# Patient Record
Sex: FEMALE | Race: WHITE | NOT HISPANIC OR LATINO | Employment: STUDENT | URBAN - NONMETROPOLITAN AREA
[De-identification: names, ages, dates, MRNs, and addresses within clinical notes are randomized per-mention and may not be internally consistent; named-entity substitution may affect disease eponyms.]

---
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
## 2024-01-31 DIAGNOSIS — F41.1 GENERALIZED ANXIETY DISORDER: ICD-10-CM

## 2024-01-31 RX ORDER — FLUOXETINE 10 MG/1
10 CAPSULE ORAL 2 TIMES DAILY
Qty: 60 CAPSULE | Refills: 3 | Status: SHIPPED | OUTPATIENT
Start: 2024-01-31 | End: 2024-05-30

## 2024-01-31 RX ORDER — FLUOXETINE HYDROCHLORIDE 20 MG/1
20 CAPSULE ORAL 2 TIMES DAILY
Qty: 60 CAPSULE | Refills: 3 | Status: SHIPPED | OUTPATIENT
Start: 2024-01-31 | End: 2024-05-30

## 2024-02-20 ENCOUNTER — OFFICE VISIT (OUTPATIENT)
Dept: FAMILY MEDICINE | Facility: CLINIC | Age: 19
End: 2024-02-20
Payer: COMMERCIAL

## 2024-02-20 VITALS
TEMPERATURE: 97 F | BODY MASS INDEX: 39.68 KG/M2 | WEIGHT: 293 LBS | HEIGHT: 72 IN | HEART RATE: 102 BPM | SYSTOLIC BLOOD PRESSURE: 132 MMHG | RESPIRATION RATE: 18 BRPM | OXYGEN SATURATION: 97 % | DIASTOLIC BLOOD PRESSURE: 88 MMHG

## 2024-02-20 DIAGNOSIS — J06.9 UPPER RESPIRATORY TRACT INFECTION, UNSPECIFIED TYPE: Primary | ICD-10-CM

## 2024-02-20 DIAGNOSIS — R05.1 ACUTE COUGH: ICD-10-CM

## 2024-02-20 DIAGNOSIS — R09.81 HEAD CONGESTION: ICD-10-CM

## 2024-02-20 DIAGNOSIS — J02.9 SORE THROAT: ICD-10-CM

## 2024-02-20 LAB
CTP QC/QA: YES
CTP QC/QA: YES
FLUAV AG NPH QL: NEGATIVE
FLUBV AG NPH QL: NEGATIVE
S PYO RRNA THROAT QL PROBE: NEGATIVE
SARS-COV-2 AG RESP QL IA.RAPID: NEGATIVE

## 2024-02-20 PROCEDURE — 3079F DIAST BP 80-89 MM HG: CPT | Mod: CPTII,,, | Performed by: NURSE PRACTITIONER

## 2024-02-20 PROCEDURE — 3075F SYST BP GE 130 - 139MM HG: CPT | Mod: CPTII,,, | Performed by: NURSE PRACTITIONER

## 2024-02-20 PROCEDURE — 1160F RVW MEDS BY RX/DR IN RCRD: CPT | Mod: CPTII,,, | Performed by: NURSE PRACTITIONER

## 2024-02-20 PROCEDURE — 3008F BODY MASS INDEX DOCD: CPT | Mod: CPTII,,, | Performed by: NURSE PRACTITIONER

## 2024-02-20 PROCEDURE — 1159F MED LIST DOCD IN RCRD: CPT | Mod: CPTII,,, | Performed by: NURSE PRACTITIONER

## 2024-02-20 PROCEDURE — 99213 OFFICE O/P EST LOW 20 MIN: CPT | Mod: ,,, | Performed by: NURSE PRACTITIONER

## 2024-02-20 PROCEDURE — 87428 SARSCOV & INF VIR A&B AG IA: CPT | Mod: QW,,, | Performed by: NURSE PRACTITIONER

## 2024-02-20 PROCEDURE — 87880 STREP A ASSAY W/OPTIC: CPT | Mod: QW,,, | Performed by: NURSE PRACTITIONER

## 2024-02-20 RX ORDER — METHYLPREDNISOLONE 4 MG/1
TABLET ORAL
Qty: 21 EACH | Refills: 0 | Status: SHIPPED | OUTPATIENT
Start: 2024-02-20 | End: 2024-03-05

## 2024-02-20 RX ORDER — AZITHROMYCIN 250 MG/1
TABLET, FILM COATED ORAL
Qty: 6 TABLET | Refills: 0 | Status: SHIPPED | OUTPATIENT
Start: 2024-02-20 | End: 2024-02-25

## 2024-02-20 NOTE — PROGRESS NOTES
Health Maintenance Due   Topic Date Due    Hepatitis C Screening  Never done    Lipid Panel  Never done    COVID-19 Vaccine (1) Never done    HPV Vaccines (1 - 2-dose series) Never done    HIV Screening  Never done    Chlamydia Screening  Never done    Influenza Vaccine (1) Never done     Discussed care gaps.  Not interested in vaccs/screening.  Not fasting for lipid.

## 2024-02-28 PROBLEM — J06.9 UPPER RESPIRATORY TRACT INFECTION: Status: ACTIVE | Noted: 2024-02-28

## 2024-02-28 NOTE — PROGRESS NOTES
STEPHEN Curry   [facility]  [address] [address] [address]  [phone]      PATIENT NAME: Olga Hernandez  : 2005  DATE: 24  MRN: [id number]      Billing Provider: STEPHEN Curry  Level of Service:   Patient PCP Information       Provider PCP Type    STEPHEN Curry General            Reason for Visit / Chief Complaint: Nasal Congestion, Cough, and Sore Throat (Symptoms X4 days.)       Update PCP  Update Chief Complaint         History of Present Illness / Problem Focused Workflow     19 year old female presents for head congestion,cough, sore throat  Symptoms started about 4 days ago      Review of Systems     Review of Systems   Constitutional:  Positive for fatigue. Negative for fever.   HENT:  Positive for congestion, sinus pressure and sore throat. Negative for ear pain.    Respiratory:  Positive for cough. Negative for shortness of breath.    Cardiovascular:  Negative for palpitations.   Gastrointestinal:  Negative for abdominal pain, constipation and diarrhea.   Endocrine: Negative for polydipsia and polyuria.   Musculoskeletal:  Negative for gait problem.   Neurological:  Negative for dizziness, weakness and headaches.   Psychiatric/Behavioral:  Negative for agitation and dysphoric mood.        Medical / Social / Family History     Past Medical History:   Diagnosis Date    Anxiety        Past Surgical History:   Procedure Laterality Date    INNER EAR SURGERY Bilateral     TONSILLECTOMY      WISDOM TOOTH EXTRACTION         Social History  Ms.  reports that she has been smoking cigarettes and vaping with nicotine. She started smoking about 2 years ago. She has never used smokeless tobacco. She reports that she does not drink alcohol and does not use drugs.    Family History  Ms.'s family history is not on file.    Medications and Allergies     Medications  Outpatient Medications Marked as Taking for the 24  encounter (Office Visit) with Veronika Roman FNP   Medication Sig Dispense Refill    busPIRone (BUSPAR) 7.5 MG tablet Take 1 tablet (7.5 mg total) by mouth 3 (three) times daily as needed (anxiety). 30 tablet 2    FLUoxetine 10 MG capsule Take 1 capsule (10 mg total) by mouth 2 (two) times daily. 60 capsule 3    FLUoxetine 20 MG capsule Take 1 capsule (20 mg total) by mouth 2 (two) times daily. 60 capsule 3       Allergies  Review of patient's allergies indicates:  No Known Allergies    Physical Examination     Vitals:    02/20/24 1400   BP: 132/88   Pulse: 102   Resp: 18   Temp: 97.2 °F (36.2 °C)     Physical Exam  Constitutional:       General: She is not in acute distress.  HENT:      Head: Normocephalic.      Ears:      Comments: Redness to bilat TM     Nose: Congestion present.      Mouth/Throat:      Mouth: Mucous membranes are moist.      Pharynx: Posterior oropharyngeal erythema present.   Eyes:      Extraocular Movements: Extraocular movements intact.   Cardiovascular:      Rate and Rhythm: Normal rate.   Pulmonary:      Effort: Pulmonary effort is normal. No respiratory distress.      Breath sounds: No wheezing.   Abdominal:      General: Bowel sounds are normal.      Palpations: Abdomen is soft.   Musculoskeletal:         General: Normal range of motion.      Cervical back: Normal range of motion.   Skin:     General: Skin is warm.   Neurological:      Mental Status: She is alert.   Psychiatric:         Behavior: Behavior normal.           Imaging / Labs     Office Visit on 02/20/2024   Component Date Value Ref Range Status    SARS Coronavirus 2 Antigen 02/20/2024 Negative  Negative Final    Rapid Influenza A Ag 02/20/2024 Negative  Negative Final    Rapid Influenza B Ag 02/20/2024 Negative  Negative Final     Acceptable 02/20/2024 Yes   Final    Rapid Strep A Screen 02/20/2024 Negative  Negative Final     Acceptable 02/20/2024 Yes   Final     No image results  found.      Assessment and Plan (including Health Maintenance)      Problem List  Smart Sets  Document Outside HM   :    Health Maintenance Due   Topic Date Due    Hepatitis C Screening  Never done    Lipid Panel  Never done    COVID-19 Vaccine (1) Never done    Pneumococcal Vaccines (Age 0-64) (1 of 2 - PCV) Never done    HPV Vaccines (1 - 2-dose series) Never done    HIV Screening  Never done    Chlamydia Screening  Never done    Influenza Vaccine (1) Never done       Problem List Items Addressed This Visit          ENT    Upper respiratory tract infection - Primary    Current Assessment & Plan     Head congestion, sore throat  Negative covid/flu, strep  Start zithromax, medrol dose pack po  Rest. Increase fluids as tolerated          Relevant Medications    methylPREDNISolone (MEDROL DOSEPACK) 4 mg tablet     Other Visit Diagnoses       Acute cough        Relevant Orders    POCT SARS-COV2 (COVID) with Flu Antigen (Completed)    Sore throat        Relevant Orders    POCT rapid strep A (Completed)    Head congestion        Relevant Medications    methylPREDNISolone (MEDROL DOSEPACK) 4 mg tablet            Health Maintenance Topics with due status: Not Due       Topic Last Completion Date    TETANUS VACCINE 08/01/2016       Future Appointments   Date Time Provider Department Center   4/3/2024  2:00 PM Veronika Roman FNP [facility]          Signature:  STEPHEN [facility] [address] [place] [address]  [phone]    Date of encounter: 2/20/24

## 2024-02-28 NOTE — ASSESSMENT & PLAN NOTE
Head congestion, sore throat  Negative covid/flu, strep  Start zithromax, medrol dose pack po  Rest. Increase fluids as tolerated

## 2024-03-05 ENCOUNTER — OFFICE VISIT (OUTPATIENT)
Dept: FAMILY MEDICINE | Facility: CLINIC | Age: 19
End: 2024-03-05
Payer: COMMERCIAL

## 2024-03-05 VITALS
HEART RATE: 112 BPM | RESPIRATION RATE: 18 BRPM | SYSTOLIC BLOOD PRESSURE: 162 MMHG | WEIGHT: 293 LBS | HEIGHT: 72 IN | TEMPERATURE: 97 F | DIASTOLIC BLOOD PRESSURE: 102 MMHG | OXYGEN SATURATION: 98 % | BODY MASS INDEX: 39.68 KG/M2

## 2024-03-05 DIAGNOSIS — R03.0 ELEVATED BLOOD PRESSURE READING: Primary | ICD-10-CM

## 2024-03-05 DIAGNOSIS — Z13.1 SCREENING FOR DIABETES MELLITUS: ICD-10-CM

## 2024-03-05 DIAGNOSIS — Z11.59 ENCOUNTER FOR HEPATITIS C SCREENING TEST FOR LOW RISK PATIENT: ICD-10-CM

## 2024-03-05 DIAGNOSIS — R63.5 WEIGHT GAIN: ICD-10-CM

## 2024-03-05 DIAGNOSIS — Z11.4 SCREENING FOR HIV (HUMAN IMMUNODEFICIENCY VIRUS): ICD-10-CM

## 2024-03-05 LAB
BASOPHILS # BLD AUTO: 0.07 K/UL (ref 0–0.2)
BASOPHILS NFR BLD AUTO: 0.7 % (ref 0–1)
DIFFERENTIAL METHOD BLD: ABNORMAL
EOSINOPHIL # BLD AUTO: 0.23 K/UL (ref 0–0.5)
EOSINOPHIL NFR BLD AUTO: 2.4 % (ref 1–4)
ERYTHROCYTE [DISTWIDTH] IN BLOOD BY AUTOMATED COUNT: 12.5 % (ref 11.5–14.5)
HCT VFR BLD AUTO: 41.8 % (ref 38–47)
HGB BLD-MCNC: 14.1 G/DL (ref 12–16)
IMM GRANULOCYTES # BLD AUTO: 0.05 K/UL (ref 0–0.04)
IMM GRANULOCYTES NFR BLD: 0.5 % (ref 0–0.4)
LYMPHOCYTES # BLD AUTO: 3.08 K/UL (ref 1–4.8)
LYMPHOCYTES NFR BLD AUTO: 32.7 % (ref 27–41)
MCH RBC QN AUTO: 29.2 PG (ref 27–31)
MCHC RBC AUTO-ENTMCNC: 33.7 G/DL (ref 32–36)
MCV RBC AUTO: 86.5 FL (ref 80–96)
MONOCYTES # BLD AUTO: 0.71 K/UL (ref 0–0.8)
MONOCYTES NFR BLD AUTO: 7.5 % (ref 2–6)
MPC BLD CALC-MCNC: 11.8 FL (ref 9.4–12.4)
NEUTROPHILS # BLD AUTO: 5.28 K/UL (ref 1.8–7.7)
NEUTROPHILS NFR BLD AUTO: 56.2 % (ref 53–65)
NRBC # BLD AUTO: 0 X10E3/UL
NRBC, AUTO (.00): 0 %
PLATELET # BLD AUTO: 281 K/UL (ref 150–400)
RBC # BLD AUTO: 4.83 M/UL (ref 4.2–5.4)
WBC # BLD AUTO: 9.42 K/UL (ref 4.5–11)

## 2024-03-05 PROCEDURE — 3044F HG A1C LEVEL LT 7.0%: CPT | Mod: CPTII,,, | Performed by: NURSE PRACTITIONER

## 2024-03-05 PROCEDURE — 1159F MED LIST DOCD IN RCRD: CPT | Mod: CPTII,,, | Performed by: NURSE PRACTITIONER

## 2024-03-05 PROCEDURE — 3077F SYST BP >= 140 MM HG: CPT | Mod: CPTII,,, | Performed by: NURSE PRACTITIONER

## 2024-03-05 PROCEDURE — 82607 VITAMIN B-12: CPT | Mod: ,,, | Performed by: CLINICAL MEDICAL LABORATORY

## 2024-03-05 PROCEDURE — 3080F DIAST BP >= 90 MM HG: CPT | Mod: CPTII,,, | Performed by: NURSE PRACTITIONER

## 2024-03-05 PROCEDURE — 3008F BODY MASS INDEX DOCD: CPT | Mod: CPTII,,, | Performed by: NURSE PRACTITIONER

## 2024-03-05 PROCEDURE — 1160F RVW MEDS BY RX/DR IN RCRD: CPT | Mod: CPTII,,, | Performed by: NURSE PRACTITIONER

## 2024-03-05 PROCEDURE — 83036 HEMOGLOBIN GLYCOSYLATED A1C: CPT | Mod: ,,, | Performed by: CLINICAL MEDICAL LABORATORY

## 2024-03-05 PROCEDURE — 85025 COMPLETE CBC W/AUTO DIFF WBC: CPT | Mod: ,,, | Performed by: CLINICAL MEDICAL LABORATORY

## 2024-03-05 PROCEDURE — 80053 COMPREHEN METABOLIC PANEL: CPT | Mod: ,,, | Performed by: CLINICAL MEDICAL LABORATORY

## 2024-03-05 PROCEDURE — 87389 HIV-1 AG W/HIV-1&-2 AB AG IA: CPT | Mod: ,,, | Performed by: CLINICAL MEDICAL LABORATORY

## 2024-03-05 PROCEDURE — 86803 HEPATITIS C AB TEST: CPT | Mod: ,,, | Performed by: CLINICAL MEDICAL LABORATORY

## 2024-03-05 PROCEDURE — 82306 VITAMIN D 25 HYDROXY: CPT | Mod: ,,, | Performed by: CLINICAL MEDICAL LABORATORY

## 2024-03-05 PROCEDURE — 82746 ASSAY OF FOLIC ACID SERUM: CPT | Mod: ,,, | Performed by: CLINICAL MEDICAL LABORATORY

## 2024-03-05 PROCEDURE — 99213 OFFICE O/P EST LOW 20 MIN: CPT | Mod: ,,, | Performed by: NURSE PRACTITIONER

## 2024-03-05 PROCEDURE — 84443 ASSAY THYROID STIM HORMONE: CPT | Mod: ,,, | Performed by: CLINICAL MEDICAL LABORATORY

## 2024-03-05 PROCEDURE — 82533 TOTAL CORTISOL: CPT | Mod: ,,, | Performed by: CLINICAL MEDICAL LABORATORY

## 2024-03-05 RX ORDER — CLONIDINE HYDROCHLORIDE 0.1 MG/1
0.1 TABLET ORAL
Status: COMPLETED | OUTPATIENT
Start: 2024-03-05 | End: 2024-03-05

## 2024-03-05 RX ORDER — VALSARTAN 40 MG/1
40 TABLET ORAL DAILY
Qty: 30 TABLET | Refills: 2 | Status: SHIPPED | OUTPATIENT
Start: 2024-03-05 | End: 2024-03-19 | Stop reason: ALTCHOICE

## 2024-03-05 RX ADMIN — CLONIDINE HYDROCHLORIDE 0.1 MG: 0.1 TABLET ORAL at 03:03

## 2024-03-05 NOTE — PROGRESS NOTES
Health Maintenance Due   Topic Date Due    Hepatitis C Screening  Never done    Lipid Panel  Never done    COVID-19 Vaccine (1) Never done    Pneumococcal Vaccines (Age 0-64) (1 of 2 - PCV) Never done    HPV Vaccines (1 - 2-dose series) Never done    HIV Screening  Never done    Chlamydia Screening  Never done    Influenza Vaccine (1) Never done     Discussed care gaps.  Not interested in vaccs/screenings.

## 2024-03-06 LAB
25(OH)D3 SERPL-MCNC: 33.2 NG/ML
ALBUMIN SERPL BCP-MCNC: 3.7 G/DL (ref 3.5–5)
ALBUMIN/GLOB SERPL: 1.1 {RATIO}
ALP SERPL-CCNC: 107 U/L (ref 52–144)
ALT SERPL W P-5'-P-CCNC: 42 U/L (ref 13–56)
ANION GAP SERPL CALCULATED.3IONS-SCNC: 11 MMOL/L (ref 7–16)
AST SERPL W P-5'-P-CCNC: 32 U/L (ref 15–37)
BILIRUB SERPL-MCNC: 0.3 MG/DL (ref ?–1.2)
BUN SERPL-MCNC: 11 MG/DL (ref 7–18)
BUN/CREAT SERPL: 14 (ref 6–20)
CALCIUM SERPL-MCNC: 9 MG/DL (ref 8.5–10.1)
CHLORIDE SERPL-SCNC: 106 MMOL/L (ref 98–107)
CO2 SERPL-SCNC: 28 MMOL/L (ref 21–32)
CORTIS SERPL-MCNC: 11 ΜG/DL
CREAT SERPL-MCNC: 0.77 MG/DL (ref 0.55–1.02)
EGFR (NO RACE VARIABLE) (RUSH/TITUS): 113 ML/MIN/1.73M2
EST. AVERAGE GLUCOSE BLD GHB EST-MCNC: 126 MG/DL
FOLATE SERPL-MCNC: 14.4 NG/ML (ref 3.1–17.5)
GLOBULIN SER-MCNC: 3.3 G/DL (ref 2–4)
GLUCOSE SERPL-MCNC: 107 MG/DL (ref 74–106)
HBA1C MFR BLD HPLC: 6 % (ref 4.5–6.6)
HCV AB SER QL: NORMAL
HIV 1+O+2 AB SERPL QL: NORMAL
POTASSIUM SERPL-SCNC: 4.5 MMOL/L (ref 3.5–5.1)
PROT SERPL-MCNC: 7 G/DL (ref 6.4–8.2)
SODIUM SERPL-SCNC: 140 MMOL/L (ref 136–145)
TSH SERPL DL<=0.005 MIU/L-ACNC: 0.7 UIU/ML (ref 0.36–3.74)
VIT B12 SERPL-MCNC: 234 PG/ML (ref 193–986)

## 2024-03-13 PROBLEM — R03.0 ELEVATED BLOOD PRESSURE READING: Status: ACTIVE | Noted: 2024-03-13

## 2024-03-13 PROBLEM — R63.5 WEIGHT GAIN: Status: ACTIVE | Noted: 2024-03-13

## 2024-03-13 NOTE — ASSESSMENT & PLAN NOTE
Weight gain over the past year; would like meds to help lose weight  Blood pressure is elevated  Discussed with her the need to get blood pressure under control before trying any meds  Also will get labs   Low sodium, low calorie diet

## 2024-03-13 NOTE — ASSESSMENT & PLAN NOTE
Blood pressure 162/102 upon arrival to clinic today  Decreased following clonidine 0.1 mg po   Denies any HA, dizziness, chest pain  Labs today  Will start on valsartan po daily   Monitor blood pressure at home daily  Educated on low sodium, low calorie diet

## 2024-03-13 NOTE — PROGRESS NOTES
STEPHEN Curry   [facility]  [address] [address] [address]  [phone]      PATIENT NAME: Olga Hernandez  : 2005  DATE: 3/5/24  MRN: [id number]      Billing Provider: STEPHEN Curry  Level of Service:   Patient PCP Information       Provider PCP Type    STEPHEN Curry General            Reason for Visit / Chief Complaint: Obesity (Wants to discuss weight loss options)       Update PCP  Update Chief Complaint         History of Present Illness / Problem Focused Workflow     19 year old female presents with complaints of obesity  Wants to discuss weight loss options  Blood pressure elevated upon arrival to clinic         Review of Systems     Review of Systems   Constitutional:  Positive for unexpected weight change. Negative for fatigue and fever.   HENT:  Negative for congestion.    Respiratory:  Negative for cough and shortness of breath.    Cardiovascular:  Negative for palpitations.   Gastrointestinal:  Negative for abdominal pain, constipation and diarrhea.   Endocrine: Negative for polydipsia and polyuria.   Musculoskeletal:  Negative for gait problem.   Neurological:  Negative for dizziness, weakness and headaches.   Psychiatric/Behavioral:  Negative for agitation and dysphoric mood.        Medical / Social / Family History     Past Medical History:   Diagnosis Date    Anxiety        Past Surgical History:   Procedure Laterality Date    INNER EAR SURGERY Bilateral     TONSILLECTOMY      WISDOM TOOTH EXTRACTION         Social History  Ms.  reports that she has been smoking cigarettes and vaping with nicotine. She started smoking about 2 years ago. She has never used smokeless tobacco. She reports that she does not drink alcohol and does not use drugs.    Family History  Ms.'s family history is not on file.    Medications and Allergies     Medications  Outpatient Medications Marked as Taking for the 3/5/24 encounter (Office  Visit) with Veronika Roman FNP   Medication Sig Dispense Refill    FLUoxetine 10 MG capsule Take 1 capsule (10 mg total) by mouth 2 (two) times daily. 60 capsule 3    FLUoxetine 20 MG capsule Take 1 capsule (20 mg total) by mouth 2 (two) times daily. 60 capsule 3    [DISCONTINUED] busPIRone (BUSPAR) 7.5 MG tablet Take 1 tablet (7.5 mg total) by mouth 3 (three) times daily as needed (anxiety). 30 tablet 2       Allergies  Review of patient's allergies indicates:  No Known Allergies    Physical Examination     Vitals:    03/05/24 1549   BP: (!) 162/102   Pulse: (!) 112   Resp:    Temp:      Physical Exam  Constitutional:       General: She is not in acute distress.     Appearance: She is obese.   HENT:      Head: Normocephalic.      Nose: Nose normal.      Mouth/Throat:      Mouth: Mucous membranes are moist.   Eyes:      Extraocular Movements: Extraocular movements intact.   Cardiovascular:      Rate and Rhythm: Normal rate.   Pulmonary:      Effort: Pulmonary effort is normal. No respiratory distress.   Abdominal:      General: Bowel sounds are normal.      Palpations: Abdomen is soft.   Musculoskeletal:         General: Normal range of motion.      Cervical back: Normal range of motion.   Skin:     General: Skin is warm.   Neurological:      Mental Status: She is alert.   Psychiatric:         Behavior: Behavior normal.           Imaging / Labs     Office Visit on 03/05/2024   Component Date Value Ref Range Status    Hepatitis C Ab 03/05/2024 Non-Reactive  Non-Reactive Final    HIV 1/2 03/05/2024 Non-Reactive  Non-Reactive Final    TSH 03/05/2024 0.703  0.358 - 3.740 uIU/mL Final    Sodium 03/05/2024 140  136 - 145 mmol/L Final    Potassium 03/05/2024 4.5  3.5 - 5.1 mmol/L Final    Chloride 03/05/2024 106  98 - 107 mmol/L Final    CO2 03/05/2024 28  21 - 32 mmol/L Final    Anion Gap 03/05/2024 11  7 - 16 mmol/L Final    Glucose 03/05/2024 107 (H)  74 - 106 mg/dL Final    BUN 03/05/2024 11  7 - 18 mg/dL Final     Creatinine 03/05/2024 0.77  0.55 - 1.02 mg/dL Final    BUN/Creatinine Ratio 03/05/2024 14  6 - 20 Final    Calcium 03/05/2024 9.0  8.5 - 10.1 mg/dL Final    Total Protein 03/05/2024 7.0  6.4 - 8.2 g/dL Final    Albumin 03/05/2024 3.7  3.5 - 5.0 g/dL Final    Globulin 03/05/2024 3.3  2.0 - 4.0 g/dL Final    A/G Ratio 03/05/2024 1.1   Final    Bilirubin, Total 03/05/2024 0.3  >0.0 - 1.2 mg/dL Final    Alk Phos 03/05/2024 107  52 - 144 U/L Final    ALT 03/05/2024 42  13 - 56 U/L Final    AST 03/05/2024 32  15 - 37 U/L Final    eGFR 03/05/2024 113  >=60 mL/min/1.73m2 Final    Vitamin D 25-Hydroxy, Blood 03/05/2024 33.2  ng/mL Final    Hemoglobin A1C 03/05/2024 6.0  4.5 - 6.6 % Final    Estimated Average Glucose 03/05/2024 126  mg/dL Final    Vitamin B12 03/05/2024 234  193 - 986 pg/mL Final    Folate 03/05/2024 14.4  3.1 - 17.5 ng/mL Final    Cortisol 03/05/2024 11.0  µg/dL Final    WBC 03/05/2024 9.42  4.50 - 11.00 K/uL Final    RBC 03/05/2024 4.83  4.20 - 5.40 M/uL Final    Hemoglobin 03/05/2024 14.1  12.0 - 16.0 g/dL Final    Hematocrit 03/05/2024 41.8  38.0 - 47.0 % Final    MCV 03/05/2024 86.5  80.0 - 96.0 fL Final    MCH 03/05/2024 29.2  27.0 - 31.0 pg Final    MCHC 03/05/2024 33.7  32.0 - 36.0 g/dL Final    RDW 03/05/2024 12.5  11.5 - 14.5 % Final    Platelet Count 03/05/2024 281  150 - 400 K/uL Final    MPV 03/05/2024 11.8  9.4 - 12.4 fL Final    Neutrophils % 03/05/2024 56.2  53.0 - 65.0 % Final    Lymphocytes % 03/05/2024 32.7  27.0 - 41.0 % Final    Monocytes % 03/05/2024 7.5 (H)  2.0 - 6.0 % Final    Eosinophils % 03/05/2024 2.4  1.0 - 4.0 % Final    Basophils % 03/05/2024 0.7  0.0 - 1.0 % Final    Immature Granulocytes % 03/05/2024 0.5 (H)  0.0 - 0.4 % Final    nRBC, Auto 03/05/2024 0.0  <=0.0 % Final    Neutrophils, Abs 03/05/2024 5.28  1.80 - 7.70 K/uL Final    Lymphocytes, Absolute 03/05/2024 3.08  1.00 - 4.80 K/uL Final    Monocytes, Absolute 03/05/2024 0.71  0.00 - 0.80 K/uL Final    Eosinophils,  Absolute 03/05/2024 0.23  0.00 - 0.50 K/uL Final    Basophils, Absolute 03/05/2024 0.07  0.00 - 0.20 K/uL Final    Immature Granulocytes, Absolute 03/05/2024 0.05 (H)  0.00 - 0.04 K/uL Final    nRBC, Absolute 03/05/2024 0.00  <=0.00 x10e3/uL Final    Diff Type 03/05/2024 Auto   Final     No image results found.      Assessment and Plan (including Health Maintenance)      Problem List  Smart Sets  Document Outside HM   :    Health Maintenance Due   Topic Date Due    Lipid Panel  Never done    COVID-19 Vaccine (1) Never done    Pneumococcal Vaccines (Age 0-64) (1 of 2 - PCV) Never done    HPV Vaccines (1 - 2-dose series) Never done    Chlamydia Screening  Never done    Influenza Vaccine (1) Never done       Problem List Items Addressed This Visit          Cardiac/Vascular    Elevated blood pressure reading - Primary    Current Assessment & Plan     Blood pressure 162/102 upon arrival to clinic today  Decreased following clonidine 0.1 mg po   Denies any HA, dizziness, chest pain  Labs today  Will start on valsartan po daily   Monitor blood pressure at home daily  Educated on low sodium, low calorie diet          Relevant Medications    valsartan (DIOVAN) 40 MG tablet    Other Relevant Orders    Comprehensive Metabolic Panel (Completed)    CBC Auto Differential (Completed)       Endocrine    Weight gain    Current Assessment & Plan     Weight gain over the past year; would like meds to help lose weight  Blood pressure is elevated  Discussed with her the need to get blood pressure under control before trying any meds  Also will get labs   Low sodium, low calorie diet         Relevant Orders    TSH (Completed)    Comprehensive Metabolic Panel (Completed)    CBC Auto Differential (Completed)    Vitamin D (Completed)    Vitamin B12 & Folate (Completed)    Cortisol (Completed)     Other Visit Diagnoses       Encounter for hepatitis C screening test for low risk patient        Relevant Orders    Hepatitis C Antibody  (Completed)    Screening for HIV (human immunodeficiency virus)        Relevant Orders    HIV 1/2 Ag/Ab (4th Gen) (Completed)    Screening for diabetes mellitus        Relevant Orders    Hemoglobin A1C (Completed)            Health Maintenance Topics with due status: Not Due       Topic Last Completion Date    TETANUS VACCINE 08/01/2016       Future Appointments   Date Time Provider Department Center   3/19/2024  2:40 PM Jessie, Liliana, FNP [facility]   4/3/2024  2:00 PM Jessie, Liliana, FNP [facility]          Signature:  [facility] [address] [place] [address]  [phone]    Date of encounter: 3/5/24

## 2024-03-19 ENCOUNTER — OFFICE VISIT (OUTPATIENT)
Dept: FAMILY MEDICINE | Facility: CLINIC | Age: 19
End: 2024-03-19
Payer: COMMERCIAL

## 2024-03-19 VITALS
WEIGHT: 293 LBS | DIASTOLIC BLOOD PRESSURE: 92 MMHG | HEIGHT: 72 IN | TEMPERATURE: 97 F | OXYGEN SATURATION: 97 % | HEART RATE: 89 BPM | SYSTOLIC BLOOD PRESSURE: 138 MMHG | RESPIRATION RATE: 18 BRPM | BODY MASS INDEX: 39.68 KG/M2

## 2024-03-19 DIAGNOSIS — I10 HYPERTENSION, UNSPECIFIED TYPE: Primary | ICD-10-CM

## 2024-03-19 DIAGNOSIS — R03.0 ELEVATED BLOOD PRESSURE READING: ICD-10-CM

## 2024-03-19 PROCEDURE — 1160F RVW MEDS BY RX/DR IN RCRD: CPT | Mod: CPTII,,, | Performed by: NURSE PRACTITIONER

## 2024-03-19 PROCEDURE — 4010F ACE/ARB THERAPY RXD/TAKEN: CPT | Mod: CPTII,,, | Performed by: NURSE PRACTITIONER

## 2024-03-19 PROCEDURE — 3080F DIAST BP >= 90 MM HG: CPT | Mod: CPTII,,, | Performed by: NURSE PRACTITIONER

## 2024-03-19 PROCEDURE — 3008F BODY MASS INDEX DOCD: CPT | Mod: CPTII,,, | Performed by: NURSE PRACTITIONER

## 2024-03-19 PROCEDURE — 3075F SYST BP GE 130 - 139MM HG: CPT | Mod: CPTII,,, | Performed by: NURSE PRACTITIONER

## 2024-03-19 PROCEDURE — 3044F HG A1C LEVEL LT 7.0%: CPT | Mod: CPTII,,, | Performed by: NURSE PRACTITIONER

## 2024-03-19 PROCEDURE — 1159F MED LIST DOCD IN RCRD: CPT | Mod: CPTII,,, | Performed by: NURSE PRACTITIONER

## 2024-03-19 PROCEDURE — 99213 OFFICE O/P EST LOW 20 MIN: CPT | Mod: ,,, | Performed by: NURSE PRACTITIONER

## 2024-03-19 RX ORDER — VALSARTAN AND HYDROCHLOROTHIAZIDE 320; 25 MG/1; MG/1
1 TABLET, FILM COATED ORAL DAILY
Qty: 30 TABLET | Refills: 1 | Status: SHIPPED | OUTPATIENT
Start: 2024-03-19 | End: 2024-05-30 | Stop reason: SDUPTHER

## 2024-03-19 RX ORDER — CLONIDINE HYDROCHLORIDE 0.1 MG/1
0.1 TABLET ORAL
Status: COMPLETED | OUTPATIENT
Start: 2024-03-19 | End: 2024-03-19

## 2024-03-19 RX ADMIN — CLONIDINE HYDROCHLORIDE 0.1 MG: 0.1 TABLET ORAL at 03:03

## 2024-03-19 NOTE — PROGRESS NOTES
Health Maintenance Due   Topic Date Due    Lipid Panel  Never done    COVID-19 Vaccine (1) Never done    Pneumococcal Vaccines (Age 0-64) (1 of 2 - PCV) Never done    HPV Vaccines (1 - 2-dose series) Never done    Chlamydia Screening  Never done    Influenza Vaccine (1) Never done     Discussed care gaps with pt   Pt is not interested in nay vaccines or screenings today

## 2024-03-25 PROBLEM — J06.9 UPPER RESPIRATORY TRACT INFECTION: Status: RESOLVED | Noted: 2024-02-28 | Resolved: 2024-03-25

## 2024-03-25 PROBLEM — I10 HYPERTENSION: Status: ACTIVE | Noted: 2024-03-25

## 2024-03-26 NOTE — ASSESSMENT & PLAN NOTE
Blood pressure 162/118 upon arrival to clinic  Reading down to 138/92 following 0.1 mg of clonidine  Denies any chest pain, SOB, dizziness  Change to diovan -25 po daily   Will refer to cardiology for evaluation  Monitor blood pressure at home daily  Low sodium diet

## 2024-03-26 NOTE — PROGRESS NOTES
STEPHEN Curry   [facility]  [address] [address] [address]  [phone]      PATIENT NAME: Olga Hernandez  : 2005  DATE: 3/19/24  MRN: [id number]      Billing Provider: STEPHEN Curry  Level of Service:   Patient PCP Information       Provider PCP Type    STEPHEN Curry General            Reason for Visit / Chief Complaint: Hypertension (Two week F/U for elevated BP )       Update PCP  Update Chief Complaint         History of Present Illness / Problem Focused Workflow     19 year old female presents for follow up on hypertension  Blood pressure continues to be elevated  Reports she is checking at home daily       Review of Systems     Review of Systems   Constitutional:  Positive for unexpected weight change. Negative for fatigue and fever.   HENT:  Negative for congestion.    Respiratory:  Negative for cough and shortness of breath.    Cardiovascular:  Negative for palpitations.   Gastrointestinal:  Negative for abdominal pain, constipation and diarrhea.   Endocrine: Negative for polydipsia and polyuria.   Musculoskeletal:  Negative for gait problem.   Neurological:  Negative for dizziness, weakness and headaches.   Psychiatric/Behavioral:  Negative for agitation and dysphoric mood.        Medical / Social / Family History     Past Medical History:   Diagnosis Date    Anxiety        Past Surgical History:   Procedure Laterality Date    INNER EAR SURGERY Bilateral     TONSILLECTOMY      WISDOM TOOTH EXTRACTION         Social History  Ms.  reports that she has been smoking cigarettes and vaping with nicotine. She started smoking about 2 years ago. She has never used smokeless tobacco. She reports that she does not drink alcohol and does not use drugs.    Family History  Ms.'s family history is not on file.    Medications and Allergies     Medications  Outpatient Medications Marked as Taking for the 3/19/24 encounter (Office  Visit) with Veronika Roman FNP   Medication Sig Dispense Refill    FLUoxetine 10 MG capsule Take 1 capsule (10 mg total) by mouth 2 (two) times daily. 60 capsule 3    FLUoxetine 20 MG capsule Take 1 capsule (20 mg total) by mouth 2 (two) times daily. 60 capsule 3    [DISCONTINUED] valsartan (DIOVAN) 40 MG tablet Take 1 tablet (40 mg total) by mouth once daily. 30 tablet 2       Allergies  Review of patient's allergies indicates:  No Known Allergies    Physical Examination     Vitals:    03/19/24 1530   BP: (!) 138/92   Pulse:    Resp:    Temp:      Physical Exam  Constitutional:       General: She is not in acute distress.     Appearance: She is obese.   HENT:      Head: Normocephalic.      Nose: Nose normal.      Mouth/Throat:      Mouth: Mucous membranes are moist.   Eyes:      Extraocular Movements: Extraocular movements intact.   Cardiovascular:      Rate and Rhythm: Normal rate.   Pulmonary:      Effort: Pulmonary effort is normal. No respiratory distress.   Abdominal:      General: Bowel sounds are normal.      Palpations: Abdomen is soft.   Musculoskeletal:         General: Normal range of motion.      Cervical back: Normal range of motion.   Skin:     General: Skin is warm.   Neurological:      Mental Status: She is alert.   Psychiatric:         Behavior: Behavior normal.           Imaging / Labs     No visits with results within 1 Day(s) from this visit.   Latest known visit with results is:   Office Visit on 03/05/2024   Component Date Value Ref Range Status    Hepatitis C Ab 03/05/2024 Non-Reactive  Non-Reactive Final    HIV 1/2 03/05/2024 Non-Reactive  Non-Reactive Final    TSH 03/05/2024 0.703  0.358 - 3.740 uIU/mL Final    Sodium 03/05/2024 140  136 - 145 mmol/L Final    Potassium 03/05/2024 4.5  3.5 - 5.1 mmol/L Final    Chloride 03/05/2024 106  98 - 107 mmol/L Final    CO2 03/05/2024 28  21 - 32 mmol/L Final    Anion Gap 03/05/2024 11  7 - 16 mmol/L Final    Glucose 03/05/2024 107 (H)  74 - 106  mg/dL Final    BUN 03/05/2024 11  7 - 18 mg/dL Final    Creatinine 03/05/2024 0.77  0.55 - 1.02 mg/dL Final    BUN/Creatinine Ratio 03/05/2024 14  6 - 20 Final    Calcium 03/05/2024 9.0  8.5 - 10.1 mg/dL Final    Total Protein 03/05/2024 7.0  6.4 - 8.2 g/dL Final    Albumin 03/05/2024 3.7  3.5 - 5.0 g/dL Final    Globulin 03/05/2024 3.3  2.0 - 4.0 g/dL Final    A/G Ratio 03/05/2024 1.1   Final    Bilirubin, Total 03/05/2024 0.3  >0.0 - 1.2 mg/dL Final    Alk Phos 03/05/2024 107  52 - 144 U/L Final    ALT 03/05/2024 42  13 - 56 U/L Final    AST 03/05/2024 32  15 - 37 U/L Final    eGFR 03/05/2024 113  >=60 mL/min/1.73m2 Final    Vitamin D 25-Hydroxy, Blood 03/05/2024 33.2  ng/mL Final    Hemoglobin A1C 03/05/2024 6.0  4.5 - 6.6 % Final    Estimated Average Glucose 03/05/2024 126  mg/dL Final    Vitamin B12 03/05/2024 234  193 - 986 pg/mL Final    Folate 03/05/2024 14.4  3.1 - 17.5 ng/mL Final    Cortisol 03/05/2024 11.0  µg/dL Final    WBC 03/05/2024 9.42  4.50 - 11.00 K/uL Final    RBC 03/05/2024 4.83  4.20 - 5.40 M/uL Final    Hemoglobin 03/05/2024 14.1  12.0 - 16.0 g/dL Final    Hematocrit 03/05/2024 41.8  38.0 - 47.0 % Final    MCV 03/05/2024 86.5  80.0 - 96.0 fL Final    MCH 03/05/2024 29.2  27.0 - 31.0 pg Final    MCHC 03/05/2024 33.7  32.0 - 36.0 g/dL Final    RDW 03/05/2024 12.5  11.5 - 14.5 % Final    Platelet Count 03/05/2024 281  150 - 400 K/uL Final    MPV 03/05/2024 11.8  9.4 - 12.4 fL Final    Neutrophils % 03/05/2024 56.2  53.0 - 65.0 % Final    Lymphocytes % 03/05/2024 32.7  27.0 - 41.0 % Final    Monocytes % 03/05/2024 7.5 (H)  2.0 - 6.0 % Final    Eosinophils % 03/05/2024 2.4  1.0 - 4.0 % Final    Basophils % 03/05/2024 0.7  0.0 - 1.0 % Final    Immature Granulocytes % 03/05/2024 0.5 (H)  0.0 - 0.4 % Final    nRBC, Auto 03/05/2024 0.0  <=0.0 % Final    Neutrophils, Abs 03/05/2024 5.28  1.80 - 7.70 K/uL Final    Lymphocytes, Absolute 03/05/2024 3.08  1.00 - 4.80 K/uL Final    Monocytes, Absolute  03/05/2024 0.71  0.00 - 0.80 K/uL Final    Eosinophils, Absolute 03/05/2024 0.23  0.00 - 0.50 K/uL Final    Basophils, Absolute 03/05/2024 0.07  0.00 - 0.20 K/uL Final    Immature Granulocytes, Absolute 03/05/2024 0.05 (H)  0.00 - 0.04 K/uL Final    nRBC, Absolute 03/05/2024 0.00  <=0.00 x10e3/uL Final    Diff Type 03/05/2024 Auto   Final     No image results found.      Assessment and Plan (including Health Maintenance)      Problem List  Smart Sets  Document Outside HM   :    Health Maintenance Due   Topic Date Due    Lipid Panel  Never done    COVID-19 Vaccine (1) Never done    Pneumococcal Vaccines (Age 0-64) (1 of 2 - PCV) Never done    HPV Vaccines (1 - 2-dose series) Never done    Chlamydia Screening  Never done    Influenza Vaccine (1) Never done       Problem List Items Addressed This Visit          Cardiac/Vascular    Elevated blood pressure reading    Hypertension - Primary    Current Assessment & Plan     Blood pressure 162/118 upon arrival to clinic  Reading down to 138/92 following 0.1 mg of clonidine  Denies any chest pain, SOB, dizziness  Change to diovan -25 po daily   Will refer to cardiology for evaluation  Monitor blood pressure at home daily  Low sodium diet         Relevant Medications    valsartan-hydrochlorothiazide (DIOVAN-HCT) 320-25 mg per tablet    Other Relevant Orders    Ambulatory referral/consult to Cardiology       Health Maintenance Topics with due status: Not Due       Topic Last Completion Date    TETANUS VACCINE 08/01/2016       Future Appointments   Date Time Provider Department Center   4/9/2024  3:40 PM [facility]   4/10/2024  3:00 PM Jayden Miranda MD [facility]          Signature:  [facility]PH[facility] [address] [place] [address]  [phone]    Date of encounter: 3/19/24

## 2024-04-09 ENCOUNTER — OFFICE VISIT (OUTPATIENT)
Dept: FAMILY MEDICINE | Facility: CLINIC | Age: 19
End: 2024-04-09
Payer: COMMERCIAL

## 2024-04-09 VITALS
BODY MASS INDEX: 39.68 KG/M2 | WEIGHT: 293 LBS | DIASTOLIC BLOOD PRESSURE: 78 MMHG | TEMPERATURE: 98 F | SYSTOLIC BLOOD PRESSURE: 122 MMHG | RESPIRATION RATE: 18 BRPM | HEART RATE: 94 BPM | HEIGHT: 72 IN | OXYGEN SATURATION: 98 %

## 2024-04-09 DIAGNOSIS — I10 HYPERTENSION, UNSPECIFIED TYPE: Primary | ICD-10-CM

## 2024-04-09 DIAGNOSIS — E66.01 MORBID OBESITY WITH BMI OF 40.0-44.9, ADULT: ICD-10-CM

## 2024-04-09 DIAGNOSIS — E88.810 METABOLIC SYNDROME: ICD-10-CM

## 2024-04-09 PROCEDURE — 3078F DIAST BP <80 MM HG: CPT | Mod: CPTII,,, | Performed by: NURSE PRACTITIONER

## 2024-04-09 PROCEDURE — 1160F RVW MEDS BY RX/DR IN RCRD: CPT | Mod: CPTII,,, | Performed by: NURSE PRACTITIONER

## 2024-04-09 PROCEDURE — 3044F HG A1C LEVEL LT 7.0%: CPT | Mod: CPTII,,, | Performed by: NURSE PRACTITIONER

## 2024-04-09 PROCEDURE — 4010F ACE/ARB THERAPY RXD/TAKEN: CPT | Mod: CPTII,,, | Performed by: NURSE PRACTITIONER

## 2024-04-09 PROCEDURE — 1159F MED LIST DOCD IN RCRD: CPT | Mod: CPTII,,, | Performed by: NURSE PRACTITIONER

## 2024-04-09 PROCEDURE — 3008F BODY MASS INDEX DOCD: CPT | Mod: CPTII,,, | Performed by: NURSE PRACTITIONER

## 2024-04-09 PROCEDURE — 3074F SYST BP LT 130 MM HG: CPT | Mod: CPTII,,, | Performed by: NURSE PRACTITIONER

## 2024-04-09 PROCEDURE — 99213 OFFICE O/P EST LOW 20 MIN: CPT | Mod: ,,, | Performed by: NURSE PRACTITIONER

## 2024-04-09 RX ORDER — TIRZEPATIDE 2.5 MG/.5ML
2.5 INJECTION, SOLUTION SUBCUTANEOUS
Qty: 4 PEN | Refills: 1 | Status: SHIPPED | OUTPATIENT
Start: 2024-04-09 | End: 2024-05-21 | Stop reason: CLARIF

## 2024-04-09 NOTE — PROGRESS NOTES
Health Maintenance Due   Topic Date Due    Lipid Panel  Never done    COVID-19 Vaccine (1) Never done    Pneumococcal Vaccines (Age 0-64) (1 of 2 - PCV) Never done    HPV Vaccines (1 - 2-dose series) Never done    Chlamydia Screening  Never done    Influenza Vaccine (1) Never done     Discussed care gaps with pt   Pt is not interested in any vaccines or screenings

## 2024-04-10 DIAGNOSIS — I10 HYPERTENSION, UNSPECIFIED TYPE: Primary | ICD-10-CM

## 2024-04-15 PROBLEM — E88.810 METABOLIC SYNDROME: Status: ACTIVE | Noted: 2024-04-15

## 2024-04-15 PROBLEM — E66.01 MORBID OBESITY WITH BMI OF 40.0-44.9, ADULT: Status: ACTIVE | Noted: 2024-04-15

## 2024-04-15 NOTE — ASSESSMENT & PLAN NOTE
Condition is stable  States mom is getting her an appointment with cardiology of her choice  Educated on importance of being evaluated  Low sodium diet   Continue to monitor blood pressure at home  Continue current meds

## 2024-04-15 NOTE — PROGRESS NOTES
STEPHEN Curry   [facility]  [id number] [address] [place] [address]  [phone]      PATIENT NAME: Olga Hernandez  : 2005  DATE: 24  MRN: [id number]      Billing Provider: STEPHEN Curry  Level of Service:   Patient PCP Information       Provider PCP Type    STEPHEN Curry [facility]            Reason for Visit / Chief Complaint: Hypertension (Three week F/U for HTN ) and Obesity (Weight management/Requesting medication for weight loss  )       Update PCP  Update Chief Complaint         History of Present Illness / Problem Focused Workflow     19 year old female presents for follow up on hypertension  Blood pressure has improved  Requesting meds to help with weight loss       Review of Systems     Review of Systems   Constitutional:  Positive for unexpected weight change. Negative for fatigue and fever.   HENT:  Negative for congestion.    Respiratory:  Negative for cough and shortness of breath.    Cardiovascular:  Negative for palpitations.   Gastrointestinal:  Negative for abdominal pain, constipation and diarrhea.   Endocrine: Negative for polydipsia and polyuria.   Musculoskeletal:  Negative for gait problem.   Neurological:  Negative for dizziness, weakness and headaches.   Psychiatric/Behavioral:  Negative for agitation and dysphoric mood.        Medical / Social / Family History     Past Medical History:   Diagnosis Date    Anxiety        Past Surgical History:   Procedure Laterality Date    INNER EAR SURGERY Bilateral     TONSILLECTOMY      WISDOM TOOTH EXTRACTION         Social History  Ms.  reports that she has been smoking cigarettes and vaping with nicotine. She started smoking about 2 years ago. She has never used smokeless tobacco. She reports that she does not drink alcohol and does not use drugs.    Family History  Ms.'s family history is not on file.    Medications and Allergies     Medications  Current Outpatient  Medications   Medication Sig Dispense Refill    FLUoxetine 10 MG capsule Take 1 capsule (10 mg total) by mouth 2 (two) times daily. 60 capsule 3    FLUoxetine 20 MG capsule Take 1 capsule (20 mg total) by mouth 2 (two) times daily. 60 capsule 3    valsartan-hydrochlorothiazide (DIOVAN-HCT) 320-25 mg per tablet Take 1 tablet by mouth once daily. 30 tablet 1    tirzepatide, weight loss, (ZEPBOUND) 2.5 mg/0.5 mL PnIj Inject 2.5 mg into the skin every 7 days. 4 Pen 1     No current facility-administered medications for this visit.       Allergies  Review of patient's allergies indicates:  No Known Allergies    Physical Examination     Vitals:    04/09/24 1556   BP: 122/78   Pulse: 94   Resp: 18   Temp: 98.1 °F (36.7 °C)     Physical Exam  Constitutional:       General: She is not in acute distress.     Appearance: She is obese.   HENT:      Head: Normocephalic.      Nose: Nose normal.      Mouth/Throat:      Mouth: Mucous membranes are moist.   Eyes:      Extraocular Movements: Extraocular movements intact.   Cardiovascular:      Rate and Rhythm: Normal rate.   Pulmonary:      Effort: Pulmonary effort is normal. No respiratory distress.   Abdominal:      General: Bowel sounds are normal.      Palpations: Abdomen is soft.   Musculoskeletal:         General: Normal range of motion.      Cervical back: Normal range of motion.   Skin:     General: Skin is warm.   Neurological:      Mental Status: She is alert.   Psychiatric:         Behavior: Behavior normal.           Imaging / Labs     No visits with results within 1 Day(s) from this visit.   Latest known visit with results is:   Office Visit on 03/05/2024   Component Date Value Ref Range Status    Hepatitis C Ab 03/05/2024 Non-Reactive  Non-Reactive Final    HIV 1/2 03/05/2024 Non-Reactive  Non-Reactive Final    TSH 03/05/2024 0.703  0.358 - 3.740 uIU/mL Final    Sodium 03/05/2024 140  136 - 145 mmol/L Final    Potassium 03/05/2024 4.5  3.5 - 5.1 mmol/L Final    Chloride  03/05/2024 106  98 - 107 mmol/L Final    CO2 03/05/2024 28  21 - 32 mmol/L Final    Anion Gap 03/05/2024 11  7 - 16 mmol/L Final    Glucose 03/05/2024 107 (H)  74 - 106 mg/dL Final    BUN 03/05/2024 11  7 - 18 mg/dL Final    Creatinine 03/05/2024 0.77  0.55 - 1.02 mg/dL Final    BUN/Creatinine Ratio 03/05/2024 14  6 - 20 Final    Calcium 03/05/2024 9.0  8.5 - 10.1 mg/dL Final    Total Protein 03/05/2024 7.0  6.4 - 8.2 g/dL Final    Albumin 03/05/2024 3.7  3.5 - 5.0 g/dL Final    Globulin 03/05/2024 3.3  2.0 - 4.0 g/dL Final    A/G Ratio 03/05/2024 1.1   Final    Bilirubin, Total 03/05/2024 0.3  >0.0 - 1.2 mg/dL Final    Alk Phos 03/05/2024 107  52 - 144 U/L Final    ALT 03/05/2024 42  13 - 56 U/L Final    AST 03/05/2024 32  15 - 37 U/L Final    eGFR 03/05/2024 113  >=60 mL/min/1.73m2 Final    Vitamin D 25-Hydroxy, Blood 03/05/2024 33.2  ng/mL Final    Hemoglobin A1C 03/05/2024 6.0  4.5 - 6.6 % Final    Estimated Average Glucose 03/05/2024 126  mg/dL Final    Vitamin B12 03/05/2024 234  193 - 986 pg/mL Final    Folate 03/05/2024 14.4  3.1 - 17.5 ng/mL Final    Cortisol 03/05/2024 11.0  µg/dL Final    WBC 03/05/2024 9.42  4.50 - 11.00 K/uL Final    RBC 03/05/2024 4.83  4.20 - 5.40 M/uL Final    Hemoglobin 03/05/2024 14.1  12.0 - 16.0 g/dL Final    Hematocrit 03/05/2024 41.8  38.0 - 47.0 % Final    MCV 03/05/2024 86.5  80.0 - 96.0 fL Final    MCH 03/05/2024 29.2  27.0 - 31.0 pg Final    MCHC 03/05/2024 33.7  32.0 - 36.0 g/dL Final    RDW 03/05/2024 12.5  11.5 - 14.5 % Final    Platelet Count 03/05/2024 281  150 - 400 K/uL Final    MPV 03/05/2024 11.8  9.4 - 12.4 fL Final    Neutrophils % 03/05/2024 56.2  53.0 - 65.0 % Final    Lymphocytes % 03/05/2024 32.7  27.0 - 41.0 % Final    Monocytes % 03/05/2024 7.5 (H)  2.0 - 6.0 % Final    Eosinophils % 03/05/2024 2.4  1.0 - 4.0 % Final    Basophils % 03/05/2024 0.7  0.0 - 1.0 % Final    Immature Granulocytes % 03/05/2024 0.5 (H)  0.0 - 0.4 % Final    nRBC, Auto 03/05/2024 0.0   <=0.0 % Final    Neutrophils, Abs 03/05/2024 5.28  1.80 - 7.70 K/uL Final    Lymphocytes, Absolute 03/05/2024 3.08  1.00 - 4.80 K/uL Final    Monocytes, Absolute 03/05/2024 0.71  0.00 - 0.80 K/uL Final    Eosinophils, Absolute 03/05/2024 0.23  0.00 - 0.50 K/uL Final    Basophils, Absolute 03/05/2024 0.07  0.00 - 0.20 K/uL Final    Immature Granulocytes, Absolute 03/05/2024 0.05 (H)  0.00 - 0.04 K/uL Final    nRBC, Absolute 03/05/2024 0.00  <=0.00 x10e3/uL Final    Diff Type 03/05/2024 Auto   Final     No image results found.      Assessment and Plan (including Health Maintenance)      Problem List  Smart Sets  Document Outside HM   :    Health Maintenance Due   Topic Date Due    Lipid Panel  Never done    Pneumococcal Vaccines (Age 0-64) (1 of 2 - PCV) Never done    Chlamydia Screening  Never done    HPV Vaccines (1 - 3-dose series) Never done    Influenza Vaccine (1) Never done    COVID-19 Vaccine (1 - 2023-24 season) Never done       Problem List Items Addressed This Visit          Cardiac/Vascular    Hypertension - Primary    Current Assessment & Plan     Condition is stable  States mom is getting her an appointment with cardiology of her choice  Educated on importance of being evaluated  Low sodium diet   Continue to monitor blood pressure at home  Continue current meds             Endocrine    Morbid obesity with BMI of 40.0-44.9, adult    Current Assessment & Plan     Requesting meds for weight loss   Discussed risks/benefits/alternatives for meds  Start zepbound injection weekly  Educated on low fat, low calorie diet, increase water intake  Exercise at least 30 min x 5 times weekly  Increase water intake          Relevant Medications    tirzepatide, weight loss, (ZEPBOUND) 2.5 mg/0.5 mL PnIj    Metabolic syndrome    Relevant Medications    tirzepatide, weight loss, (ZEPBOUND) 2.5 mg/0.5 mL PnIj       Health Maintenance Topics with due status: Not Due       Topic Last Completion Date    TETANUS VACCINE  08/01/2016       Future Appointments   Date Time Provider Department Center   5/9/2024  3:00 PM [facility]          Signature:  [facility] [id number] [place] [address]  [phone]    Date of encounter: 4/9/24

## 2024-04-15 NOTE — ASSESSMENT & PLAN NOTE
Requesting meds for weight loss   Discussed risks/benefits/alternatives for meds  Start zepbound injection weekly  Educated on low fat, low calorie diet, increase water intake  Exercise at least 30 min x 5 times weekly  Increase water intake

## 2024-04-17 ENCOUNTER — TELEPHONE (OUTPATIENT)
Dept: FAMILY MEDICINE | Facility: CLINIC | Age: 19
End: 2024-04-17
Payer: COMMERCIAL

## 2024-04-17 NOTE — TELEPHONE ENCOUNTER
----- Message from Neela Jones sent at 4/16/2024  2:46 PM CDT -----  Contact: [phone]  Pt needs too discuss her mds with the nurse

## 2024-04-30 ENCOUNTER — PATIENT MESSAGE (OUTPATIENT)
Dept: FAMILY MEDICINE | Facility: CLINIC | Age: 19
End: 2024-04-30
Payer: COMMERCIAL

## 2024-05-21 ENCOUNTER — HOSPITAL ENCOUNTER (EMERGENCY)
Facility: HOSPITAL | Age: 19
Discharge: HOME | End: 2024-05-21
Attending: EMERGENCY MEDICINE
Payer: COMMERCIAL

## 2024-05-21 VITALS
HEIGHT: 72 IN | TEMPERATURE: 98 F | RESPIRATION RATE: 16 BRPM | OXYGEN SATURATION: 97 % | HEART RATE: 82 BPM | WEIGHT: 293 LBS | SYSTOLIC BLOOD PRESSURE: 110 MMHG | BODY MASS INDEX: 39.68 KG/M2 | DIASTOLIC BLOOD PRESSURE: 59 MMHG

## 2024-05-21 DIAGNOSIS — R07.9 CHEST PAIN: Primary | ICD-10-CM

## 2024-05-21 LAB
ALBUMIN SERPL BCP-MCNC: 4 G/DL (ref 3.5–5)
ALBUMIN/GLOB SERPL: 1 {RATIO}
ALP SERPL-CCNC: 99 U/L (ref 52–144)
ALT SERPL W P-5'-P-CCNC: 48 U/L (ref 13–56)
ANION GAP SERPL CALCULATED.3IONS-SCNC: 13 MMOL/L (ref 7–16)
AST SERPL W P-5'-P-CCNC: 31 U/L (ref 15–37)
B-HCG UR QL: NEGATIVE
BASOPHILS # BLD AUTO: 0.06 K/UL (ref 0–0.2)
BASOPHILS NFR BLD AUTO: 0.8 % (ref 0–1)
BILIRUB SERPL-MCNC: 0.3 MG/DL (ref ?–1.2)
BILIRUB UR QL STRIP: NEGATIVE
BUN SERPL-MCNC: 15 MG/DL (ref 7–18)
BUN/CREAT SERPL: 20 (ref 6–20)
CALCIUM SERPL-MCNC: 9.7 MG/DL (ref 8.5–10.1)
CHLORIDE SERPL-SCNC: 99 MMOL/L (ref 98–107)
CLARITY UR: CLEAR
CO2 SERPL-SCNC: 29 MMOL/L (ref 21–32)
COLOR UR: COLORLESS
CREAT SERPL-MCNC: 0.76 MG/DL (ref 0.55–1.02)
CTP QC/QA: YES
DIFFERENTIAL METHOD BLD: ABNORMAL
EGFR (NO RACE VARIABLE) (RUSH/TITUS): 115 ML/MIN/1.73M2
EOSINOPHIL # BLD AUTO: 0.11 K/UL (ref 0–0.5)
EOSINOPHIL NFR BLD AUTO: 1.4 % (ref 1–4)
ERYTHROCYTE [DISTWIDTH] IN BLOOD BY AUTOMATED COUNT: 12.8 % (ref 11.5–14.5)
GLOBULIN SER-MCNC: 4.1 G/DL (ref 2–4)
GLUCOSE SERPL-MCNC: 97 MG/DL (ref 74–106)
GLUCOSE UR STRIP-MCNC: NORMAL MG/DL
HCT VFR BLD AUTO: 41.7 % (ref 38–47)
HGB BLD-MCNC: 13.5 G/DL (ref 12–16)
IMM GRANULOCYTES # BLD AUTO: 0.04 K/UL (ref 0–0.04)
IMM GRANULOCYTES NFR BLD: 0.5 % (ref 0–0.4)
KETONES UR STRIP-SCNC: NEGATIVE MG/DL
LEUKOCYTE ESTERASE UR QL STRIP: NEGATIVE
LYMPHOCYTES # BLD AUTO: 2.77 K/UL (ref 1–4.8)
LYMPHOCYTES NFR BLD AUTO: 35 % (ref 27–41)
MCH RBC QN AUTO: 29 PG (ref 27–31)
MCHC RBC AUTO-ENTMCNC: 32.4 G/DL (ref 32–36)
MCV RBC AUTO: 89.5 FL (ref 80–96)
MONOCYTES # BLD AUTO: 0.64 K/UL (ref 0–0.8)
MONOCYTES NFR BLD AUTO: 8.1 % (ref 2–6)
MPC BLD CALC-MCNC: 11.4 FL (ref 9.4–12.4)
NEUTROPHILS # BLD AUTO: 4.3 K/UL (ref 1.8–7.7)
NEUTROPHILS NFR BLD AUTO: 54.2 % (ref 53–65)
NITRITE UR QL STRIP: NEGATIVE
NRBC # BLD AUTO: 0 X10E3/UL
NRBC, AUTO (.00): 0 %
NT-PROBNP SERPL-MCNC: 19 PG/ML (ref 1–125)
OHS QRS DURATION: 100 MS
OHS QTC CALCULATION: 415 MS
PH UR STRIP: 6.5 PH UNITS
PLATELET # BLD AUTO: 277 K/UL (ref 150–400)
POTASSIUM SERPL-SCNC: 3.7 MMOL/L (ref 3.5–5.1)
PROT SERPL-MCNC: 8.1 G/DL (ref 6.4–8.2)
PROT UR QL STRIP: NEGATIVE
RBC # BLD AUTO: 4.66 M/UL (ref 4.2–5.4)
RBC # UR STRIP: NEGATIVE /UL
SODIUM SERPL-SCNC: 137 MMOL/L (ref 136–145)
SP GR UR STRIP: 1.01
TROPONIN I SERPL DL<=0.01 NG/ML-MCNC: <4 PG/ML
UROBILINOGEN UR STRIP-ACNC: NORMAL MG/DL
WBC # BLD AUTO: 7.92 K/UL (ref 4.5–11)

## 2024-05-21 PROCEDURE — 83880 ASSAY OF NATRIURETIC PEPTIDE: CPT | Performed by: EMERGENCY MEDICINE

## 2024-05-21 PROCEDURE — 81025 URINE PREGNANCY TEST: CPT | Performed by: EMERGENCY MEDICINE

## 2024-05-21 PROCEDURE — 85025 COMPLETE CBC W/AUTO DIFF WBC: CPT | Performed by: EMERGENCY MEDICINE

## 2024-05-21 PROCEDURE — 36415 COLL VENOUS BLD VENIPUNCTURE: CPT | Performed by: EMERGENCY MEDICINE

## 2024-05-21 PROCEDURE — 99285 EMERGENCY DEPT VISIT HI MDM: CPT | Mod: 25

## 2024-05-21 PROCEDURE — 80053 COMPREHEN METABOLIC PANEL: CPT | Performed by: EMERGENCY MEDICINE

## 2024-05-21 PROCEDURE — 81003 URINALYSIS AUTO W/O SCOPE: CPT | Performed by: EMERGENCY MEDICINE

## 2024-05-21 PROCEDURE — 63600175 PHARM REV CODE 636 W HCPCS: Performed by: EMERGENCY MEDICINE

## 2024-05-21 PROCEDURE — 93005 ELECTROCARDIOGRAM TRACING: CPT

## 2024-05-21 PROCEDURE — 96374 THER/PROPH/DIAG INJ IV PUSH: CPT

## 2024-05-21 PROCEDURE — 84484 ASSAY OF TROPONIN QUANT: CPT | Performed by: EMERGENCY MEDICINE

## 2024-05-21 PROCEDURE — 93010 ELECTROCARDIOGRAM REPORT: CPT | Mod: ,,, | Performed by: HOSPITALIST

## 2024-05-21 PROCEDURE — 25000003 PHARM REV CODE 250: Performed by: EMERGENCY MEDICINE

## 2024-05-21 RX ORDER — LIDOCAINE HYDROCHLORIDE 20 MG/ML
15 SOLUTION OROPHARYNGEAL ONCE
Status: COMPLETED | OUTPATIENT
Start: 2024-05-21 | End: 2024-05-21

## 2024-05-21 RX ORDER — ALUMINUM HYDROXIDE, MAGNESIUM HYDROXIDE, AND SIMETHICONE 1200; 120; 1200 MG/30ML; MG/30ML; MG/30ML
60 SUSPENSION ORAL ONCE
Status: COMPLETED | OUTPATIENT
Start: 2024-05-21 | End: 2024-05-21

## 2024-05-21 RX ORDER — KETOROLAC TROMETHAMINE 15 MG/ML
15 INJECTION, SOLUTION INTRAMUSCULAR; INTRAVENOUS
Status: COMPLETED | OUTPATIENT
Start: 2024-05-21 | End: 2024-05-21

## 2024-05-21 RX ORDER — PANTOPRAZOLE SODIUM 40 MG/1
40 TABLET, DELAYED RELEASE ORAL DAILY
Qty: 90 TABLET | Refills: 0 | Status: SHIPPED | OUTPATIENT
Start: 2024-05-21 | End: 2024-05-30 | Stop reason: SDUPTHER

## 2024-05-21 RX ADMIN — KETOROLAC TROMETHAMINE 15 MG: 15 INJECTION, SOLUTION INTRAMUSCULAR; INTRAVENOUS at 12:05

## 2024-05-21 RX ADMIN — ALUMINUM HYDROXIDE, MAGNESIUM HYDROXIDE, AND SIMETHICONE 60 ML: 200; 200; 20 SUSPENSION ORAL at 02:05

## 2024-05-21 RX ADMIN — LIDOCAINE HYDROCHLORIDE 15 ML: 20 SOLUTION ORAL at 02:05

## 2024-05-21 NOTE — ED NOTES
Contacted pt mom per request; pt is tearful and upset regarding situation. Updated her on plan as of now. Mom is en route from [place].

## 2024-05-21 NOTE — ED NOTES
Patient reports she is still in pain. Pain begins on right side below breast and around to posterior back and up to shoulder. Mom expresses concern for galbladder involvement. Concerns and pain voiced to Dr. Elam. New orders placed.

## 2024-05-21 NOTE — ED PROVIDER NOTES
Encounter Date: 5/21/2024    SCRIBE #1 NOTE: I, Sabra Isabellefernando, am scribing for, and in the presence of,  Tommy Elam MD. I have scribed the entire note.       History     Chief Complaint   Patient presents with    Chest Pain    Dizziness     19 year old female presents to the ED complaining of chest pain and dizziness. Patient says that she has been having right sided chest pain for the past week. She says that the pain happens randomly. Patient reports today having chest pain that started around 1 hour ago while she was standing watching a surgery. She says that when she started having chest pain she did feel like her heart was racing. Patient reports some SOB and also says she feels lightheaded on today. She states that she was scheduled to see a cardiologist in [place] last week but says that she was unable to make her appointment.     The history is provided by the patient. No  was used.     Review of patient's allergies indicates:  No Known Allergies  Past Medical History:   Diagnosis Date    Anxiety     Hypertension      Past Surgical History:   Procedure Laterality Date    INNER EAR SURGERY Bilateral     TONSILLECTOMY      WISDOM TOOTH EXTRACTION       No family history on file.  Social History     Tobacco Use    Smoking status: Every Day     Types: Vaping with nicotine     Start date: 2/1/2022    Smokeless tobacco: Never   Substance Use Topics    Alcohol use: Yes    Drug use: Never     Review of Systems   Respiratory:  Positive for shortness of breath.    Cardiovascular:  Positive for chest pain.   Neurological:  Positive for light-headedness.       Physical Exam     Initial Vitals [05/21/24 1002]   BP Pulse Resp Temp SpO2   (!) 155/91 95 18 98 °F (36.7 °C) 97 %      MAP       --         Physical Exam    Nursing note and vitals reviewed.  Eyes: Conjunctivae are normal. Pupils are equal, round, and reactive to light.   Neck:   Normal range of motion.  Cardiovascular:  Regular rhythm  and normal heart sounds.           Pulmonary/Chest: Breath sounds normal.   Abdominal: Abdomen is soft.   Musculoskeletal:         General: Normal range of motion.      Cervical back: Normal range of motion.     Neurological: She is alert and oriented to person, place, and time. She has normal strength and normal reflexes.         ED Course   Procedures  Labs Reviewed   COMPREHENSIVE METABOLIC PANEL - Abnormal; Notable for the following components:       Result Value    Globulin 4.1 (*)     All other components within normal limits   CBC WITH DIFFERENTIAL - Abnormal; Notable for the following components:    Monocytes % 8.1 (*)     Immature Granulocytes % 0.5 (*)     All other components within normal limits   NT-PRO NATRIURETIC PEPTIDE - Normal   TROPONIN I - Normal   CBC W/ AUTO DIFFERENTIAL    Narrative:     The following orders were created for panel order CBC auto differential.  Procedure                               Abnormality         Status                     ---------                               -----------         ------                     CBC with Differential[[id number]]       Abnormal            Final result                 Please view results for these tests on the individual orders.   URINALYSIS, REFLEX TO URINE CULTURE   POCT URINE PREGNANCY        ECG Results              EKG 12-lead (Final result)        Collection Time Result Time QRS Duration OHS QTC Calculation    05/21/24 10:12:01 05/21/24 23:23:37 100 415                     Final result by Interface, Lab In [facility] (05/21/24 23:23:45)                   Narrative:    Test Reason : R07.9,    Vent. Rate : 089 BPM     Atrial Rate : 000 BPM     P-R Int : 164 ms          QRS Dur : 100 ms      QT Int : 364 ms       P-R-T Axes : 049 055 027 degrees     QTc Int : 415 ms    Sinus rhythm  Normal ECG    Confirmed by Ger CORNELL, Sumanth RIOS ([id number]) on 5/21/2024 11:23:35 PM    Referred By: [email or web address]   SELF           Confirmed By:Sumanth Cyr MD                                   Imaging Results              US Abdomen Limited_Gallbladder (Final result)  Result time 05/21/24 13:27:06      Final result by Jarad Fulton DO (05/21/24 13:27:06)                   Impression:      Moderate diffuse hepatic steatosis. The liver is enlarged measuring 20 cm.    Ultrasound images captured and stored.      Electronically signed by: Jarad Fulton  Date:    05/21/2024  Time:    13:27               Narrative:    EXAMINATION:  US ABDOMEN LIMITED_GALLBLADDER    CLINICAL HISTORY:  pain;    TECHNIQUE:  Multiplane limited abdominal ultrasound with grayscale and color Doppler.    COMPARISON:  None.    FINDINGS:  Moderate diffuse hepatic steatosis.  The liver is enlarged measuring 20 cm.    There is no evidence for cholelithiasis, gallbladder wall thickening, or pericholecystic fluid.    The common bile duct measures 4 mm.    The right kidney measures 12.8 cm.  The right kidney is normal.    The pancreas as visualized is unremarkable in appearance.    The visualized aorta and IVC are unremarkable in appearance.                                       X-Ray Chest AP Portable (Final result)  Result time 05/21/24 10:43:11      Final result by Capo Castaneda II, MD (05/21/24 10:43:11)                   Impression:      No evidence of cardiopulmonary disease.      Electronically signed by: Capo Castaneda  Date:    05/21/2024  Time:    10:43               Narrative:    EXAMINATION:  XR CHEST AP PORTABLE    CLINICAL HISTORY:  Chest pain, unspecified    COMPARISON:  None available    TECHNIQUE:  XR CHEST AP PORTABLE    FINDINGS:  The heart and mediastinum are normal in size and configuration.  The pulmonary vascularity is normal in caliber.  No lung infiltrates, effusions, pneumothorax or other abnormality is demonstrated.                                    X-Rays:   Independently Interpreted Readings:   Other Readings:  US Abdomen Limited Gallbladder   Moderate diffuse hepatic  steatosis. The liver is enlarged measuring 20 cm.    X-Ray Chest AP Portable   No evidence of cardiopulmonary disease.      Medications   ketorolac injection 15 mg (15 mg Intravenous Given 24 1205)   aluminum-magnesium hydroxide-simethicone 200-200-20 mg/5 mL suspension 60 mL (60 mLs Oral Given 24 1402)     And   LIDOcaine viscous HCl 2% oral solution 15 mL (15 mLs Oral Given 24 1402)     Medical Decision Making  20 y/o female with CP and dizziness...      DDX:  CARDIAC VS CHEST WALL VS GI VS OTHER    OUTPATIENT FOLLOW UP...    Amount and/or Complexity of Data Reviewed  Labs: ordered. Decision-making details documented in ED Course.  Radiology: ordered. Decision-making details documented in ED Course.    Risk  OTC drugs.  Prescription drug management.              Attending Attestation:           Physician Attestation for Scribe:  Physician Attestation Statement for Scribe #1: I, Tommy Elam MD, reviewed documentation, as scribed by Sabra Ngo in my presence, and it is both accurate and complete.             ED Course as of 24e May 21, 2024   1330 US Abdomen Limited Gallbladder   Moderate diffuse hepatic steatosis. The liver is enlarged measuring 20 cm.     Ultrasound images captured and stored.   [AM]   1330 X-Ray Chest AP Portable   No evidence of cardiopulmonary disease.      [AM]      ED Course User Index  [AM] Sabra Ngo                             Clinical Impression:  Final diagnoses:  [R07.9] Chest pain (Primary)          ED Disposition Condition    Discharge Stable          ED Prescriptions       Medication Sig Dispense Start Date End Date Auth. Provider    pantoprazole (PROTONIX) 40 MG tablet () Take 1 tablet (40 mg total) by mouth once daily. 90 tablet 2024 Tommy Elam MD          Follow-up Information       Follow up With Specialties Details Why Contact Info    Veronika Roman FNP Family Medicine, Wound Care  As needed [address] [place] [address]  Dr [place] [address]  [phone]               Tommy Elam MD  06/09/24 1866

## 2024-05-21 NOTE — DISCHARGE INSTRUCTIONS
TAKE IBUPROFEN OR NAPROSYN FOR CHEST WALL PAIN.  FOLLOW UP WITH CARDIOLOGY FOR RAPID PALPITATIONS.  RETURN TO THE EMERGENCY DEPARTMENT AS NEEDED.

## 2024-05-21 NOTE — ED TRIAGE NOTES
Patient arrives to ED with complaints of chest tightness and cramping. Patient states that pain has been ongoing for about a week and states that she missed a new patient cardiology appointment recently due to busy schedule. Patient also states that she was standing in OR at work and became very dizzy suddenly.

## 2024-05-22 ENCOUNTER — TELEPHONE (OUTPATIENT)
Dept: EMERGENCY MEDICINE | Facility: HOSPITAL | Age: 19
End: 2024-05-22
Payer: COMMERCIAL

## 2024-05-30 ENCOUNTER — OFFICE VISIT (OUTPATIENT)
Dept: FAMILY MEDICINE | Facility: CLINIC | Age: 19
End: 2024-05-30
Payer: COMMERCIAL

## 2024-05-30 VITALS
HEART RATE: 88 BPM | RESPIRATION RATE: 18 BRPM | DIASTOLIC BLOOD PRESSURE: 78 MMHG | SYSTOLIC BLOOD PRESSURE: 122 MMHG | BODY MASS INDEX: 39.68 KG/M2 | HEIGHT: 72 IN | OXYGEN SATURATION: 98 % | WEIGHT: 293 LBS | TEMPERATURE: 98 F

## 2024-05-30 DIAGNOSIS — F41.1 GENERALIZED ANXIETY DISORDER: ICD-10-CM

## 2024-05-30 DIAGNOSIS — K21.9 GASTROESOPHAGEAL REFLUX DISEASE, UNSPECIFIED WHETHER ESOPHAGITIS PRESENT: ICD-10-CM

## 2024-05-30 DIAGNOSIS — I10 HYPERTENSION, UNSPECIFIED TYPE: Primary | ICD-10-CM

## 2024-05-30 PROCEDURE — 3044F HG A1C LEVEL LT 7.0%: CPT | Mod: CPTII,,, | Performed by: NURSE PRACTITIONER

## 2024-05-30 PROCEDURE — 4010F ACE/ARB THERAPY RXD/TAKEN: CPT | Mod: CPTII,,, | Performed by: NURSE PRACTITIONER

## 2024-05-30 PROCEDURE — 99213 OFFICE O/P EST LOW 20 MIN: CPT | Mod: ,,, | Performed by: NURSE PRACTITIONER

## 2024-05-30 PROCEDURE — 3074F SYST BP LT 130 MM HG: CPT | Mod: CPTII,,, | Performed by: NURSE PRACTITIONER

## 2024-05-30 PROCEDURE — 3008F BODY MASS INDEX DOCD: CPT | Mod: CPTII,,, | Performed by: NURSE PRACTITIONER

## 2024-05-30 PROCEDURE — 1160F RVW MEDS BY RX/DR IN RCRD: CPT | Mod: CPTII,,, | Performed by: NURSE PRACTITIONER

## 2024-05-30 PROCEDURE — 3078F DIAST BP <80 MM HG: CPT | Mod: CPTII,,, | Performed by: NURSE PRACTITIONER

## 2024-05-30 PROCEDURE — 1159F MED LIST DOCD IN RCRD: CPT | Mod: CPTII,,, | Performed by: NURSE PRACTITIONER

## 2024-05-30 RX ORDER — BUPROPION HYDROCHLORIDE 150 MG/1
150 TABLET ORAL DAILY
Qty: 30 TABLET | Refills: 2 | Status: SHIPPED | OUTPATIENT
Start: 2024-05-30 | End: 2025-05-30

## 2024-05-30 RX ORDER — FLUOXETINE 10 MG/1
10 CAPSULE ORAL 2 TIMES DAILY
Qty: 180 CAPSULE | Refills: 1 | Status: CANCELLED | OUTPATIENT
Start: 2024-05-30 | End: 2025-05-30

## 2024-05-30 RX ORDER — VALSARTAN AND HYDROCHLOROTHIAZIDE 320; 25 MG/1; MG/1
1 TABLET, FILM COATED ORAL DAILY
Qty: 90 TABLET | Refills: 1 | Status: SHIPPED | OUTPATIENT
Start: 2024-05-30 | End: 2025-05-30

## 2024-05-30 RX ORDER — FLUOXETINE HYDROCHLORIDE 20 MG/1
20 CAPSULE ORAL 2 TIMES DAILY
Qty: 180 CAPSULE | Refills: 1 | Status: CANCELLED | OUTPATIENT
Start: 2024-05-30 | End: 2025-05-30

## 2024-05-30 RX ORDER — PANTOPRAZOLE SODIUM 40 MG/1
40 TABLET, DELAYED RELEASE ORAL DAILY
Qty: 90 TABLET | Refills: 1 | Status: SHIPPED | OUTPATIENT
Start: 2024-05-30

## 2024-05-30 NOTE — PROGRESS NOTES
Health Maintenance Due   Topic Date Due    Lipid Panel  Never done    Pneumococcal Vaccines (Age 0-64) (1 of 2 - PCV) Never done    Chlamydia Screening  Never done    HPV Vaccines (1 - 3-dose series) Never done    COVID-19 Vaccine (1 - 2023-24 season) Never done     Discussed care gaps with pt  Pt is not interested in any vaccines or screenings today

## 2024-06-03 PROBLEM — R03.0 ELEVATED BLOOD PRESSURE READING: Status: RESOLVED | Noted: 2024-03-13 | Resolved: 2024-06-03

## 2024-06-03 NOTE — ASSESSMENT & PLAN NOTE
Requests med change from prozac and would like something to help to stop vaping  Will d/c prozac and add wellbutrin po   Discussed risks/benefits/alternatives for treatment; patient voices understanding

## 2024-06-03 NOTE — PROGRESS NOTES
STEPHEN Curry   [facility]  [address] [address] [address]  [phone]      PATIENT NAME: Olga Hernandez  : 2005  DATE: 24  MRN: [id number]      Billing Provider: STEPHEN Curry  Level of Service: [place] OFFICE/OUTPT VISIT, EST, LEVL III, 20-29 MIN  Patient PCP Information       Provider PCP Type    STEPHEN Curry General            Reason for Visit / Chief Complaint: Medication Refill and Hypertension       Update PCP  Update Chief Complaint         History of Present Illness / Problem Focused Workflow     19 year old female presents for medication refills  Blood pressure continues to be stable      Review of Systems     Review of Systems   Constitutional:  Positive for unexpected weight change. Negative for fatigue and fever.   HENT:  Negative for congestion.    Respiratory:  Negative for cough and shortness of breath.    Cardiovascular:  Negative for palpitations.   Gastrointestinal:  Negative for abdominal pain, constipation and diarrhea.   Endocrine: Negative for polydipsia and polyuria.   Musculoskeletal:  Negative for gait problem.   Neurological:  Negative for dizziness, weakness and headaches.   Psychiatric/Behavioral:  Negative for agitation and dysphoric mood.        Medical / Social / Family History     Past Medical History:   Diagnosis Date    Anxiety     Hypertension        Past Surgical History:   Procedure Laterality Date    INNER EAR SURGERY Bilateral     TONSILLECTOMY      WISDOM TOOTH EXTRACTION         Social History  Ms.  reports that she has been smoking vaping with nicotine. She started smoking about 2 years ago. She has never used smokeless tobacco. She reports current alcohol use. She reports that she does not use drugs.    Family History  Ms.'s family history is not on file.    Medications and Allergies     Medications  Outpatient Medications Marked as Taking for the 24 encounter (Office Visit)  with Veronika Roman FNP   Medication Sig Dispense Refill    [DISCONTINUED] FLUoxetine 10 MG capsule Take 1 capsule (10 mg total) by mouth 2 (two) times daily. 60 capsule 3    [DISCONTINUED] FLUoxetine 20 MG capsule Take 1 capsule (20 mg total) by mouth 2 (two) times daily. 60 capsule 3    [DISCONTINUED] pantoprazole (PROTONIX) 40 MG tablet Take 1 tablet (40 mg total) by mouth once daily. 90 tablet 0    [DISCONTINUED] valsartan-hydrochlorothiazide (DIOVAN-HCT) 320-25 mg per tablet Take 1 tablet by mouth once daily. 30 tablet 1       Allergies  Review of patient's allergies indicates:  No Known Allergies    Physical Examination     Vitals:    05/30/24 1530   BP: 122/78   Pulse: 88   Resp: 18   Temp: 97.6 °F (36.4 °C)     Physical Exam  Constitutional:       General: She is not in acute distress.     Appearance: She is obese.   HENT:      Head: Normocephalic.      Nose: Nose normal.      Mouth/Throat:      Mouth: Mucous membranes are moist.   Eyes:      Extraocular Movements: Extraocular movements intact.   Cardiovascular:      Rate and Rhythm: Normal rate.   Pulmonary:      Effort: Pulmonary effort is normal. No respiratory distress.   Abdominal:      General: Bowel sounds are normal.      Palpations: Abdomen is soft.   Musculoskeletal:         General: Normal range of motion.      Cervical back: Normal range of motion.   Skin:     General: Skin is warm.   Neurological:      Mental Status: She is alert.   Psychiatric:         Behavior: Behavior normal.           Imaging / Labs     No visits with results within 1 Day(s) from this visit.   Latest known visit with results is:   Admission on 05/21/2024, Discharged on 05/21/2024   Component Date Value Ref Range Status    Sodium 05/21/2024 137  136 - 145 mmol/L Final    Potassium 05/21/2024 3.7  3.5 - 5.1 mmol/L Final    Chloride 05/21/2024 99  98 - 107 mmol/L Final    CO2 05/21/2024 29  21 - 32 mmol/L Final    Anion Gap 05/21/2024 13  7 - 16 mmol/L Final    Glucose  05/21/2024 97  74 - 106 mg/dL Final    BUN 05/21/2024 15  7 - 18 mg/dL Final    Creatinine 05/21/2024 0.76  0.55 - 1.02 mg/dL Final    BUN/Creatinine Ratio 05/21/2024 20  6 - 20 Final    Calcium 05/21/2024 9.7  8.5 - 10.1 mg/dL Final    Total Protein 05/21/2024 8.1  6.4 - 8.2 g/dL Final    Albumin 05/21/2024 4.0  3.5 - 5.0 g/dL Final    Globulin 05/21/2024 4.1 (H)  2.0 - 4.0 g/dL Final    A/G Ratio 05/21/2024 1.0   Final    Bilirubin, Total 05/21/2024 0.3  >0.0 - 1.2 mg/dL Final    Alk Phos 05/21/2024 99  52 - 144 U/L Final    ALT 05/21/2024 48  13 - 56 U/L Final    AST 05/21/2024 31  15 - 37 U/L Final    eGFR 05/21/2024 115  >=60 mL/min/1.73m2 Final    ProBNP 05/21/2024 19  1 - 125 pg/mL Final    Troponin I High Sensitivity 05/21/2024 <4.0  <=60.4 pg/mL Final    QRS Duration 05/21/2024 100  ms Final    OHS QTC Calculation 05/21/2024 415  ms Final    WBC 05/21/2024 7.92  4.50 - 11.00 K/uL Final    RBC 05/21/2024 4.66  4.20 - 5.40 M/uL Final    Hemoglobin 05/21/2024 13.5  12.0 - 16.0 g/dL Final    Hematocrit 05/21/2024 41.7  38.0 - 47.0 % Final    MCV 05/21/2024 89.5  80.0 - 96.0 fL Final    MCH 05/21/2024 29.0  27.0 - 31.0 pg Final    MCHC 05/21/2024 32.4  32.0 - 36.0 g/dL Final    RDW 05/21/2024 12.8  11.5 - 14.5 % Final    Platelet Count 05/21/2024 277  150 - 400 K/uL Final    MPV 05/21/2024 11.4  9.4 - 12.4 fL Final    Neutrophils % 05/21/2024 54.2  53.0 - 65.0 % Final    Lymphocytes % 05/21/2024 35.0  27.0 - 41.0 % Final    Monocytes % 05/21/2024 8.1 (H)  2.0 - 6.0 % Final    Eosinophils % 05/21/2024 1.4  1.0 - 4.0 % Final    Basophils % 05/21/2024 0.8  0.0 - 1.0 % Final    Immature Granulocytes % 05/21/2024 0.5 (H)  0.0 - 0.4 % Final    nRBC, Auto 05/21/2024 0.0  <=0.0 % Final    Neutrophils, Abs 05/21/2024 4.30  1.80 - 7.70 K/uL Final    Lymphocytes, Absolute 05/21/2024 2.77  1.00 - 4.80 K/uL Final    Monocytes, Absolute 05/21/2024 0.64  0.00 - 0.80 K/uL Final    Eosinophils, Absolute 05/21/2024 0.11  0.00 -  0.50 K/uL Final    Basophils, Absolute 05/21/2024 0.06  0.00 - 0.20 K/uL Final    Immature Granulocytes, Absolute 05/21/2024 0.04  0.00 - 0.04 K/uL Final    nRBC, Absolute 05/21/2024 0.00  <=0.00 x10e3/uL Final    Diff Type 05/21/2024 Auto   Final    Color, UA 05/21/2024 Colorless  Colorless, Straw, Yellow, Light Yellow, Dark Yellow Final    Clarity, UA 05/21/2024 Clear  Clear Final    pH, UA 05/21/2024 6.5  5.0 to 8.0 pH Units Final    Leukocytes, UA 05/21/2024 Negative  Negative Final    Nitrites, UA 05/21/2024 Negative  Negative Final    Protein, UA 05/21/2024 Negative  Negative Final    Glucose, UA 05/21/2024 Normal  Normal mg/dL Final    Ketones, UA 05/21/2024 Negative  Negative mg/dL Final    Urobilinogen, UA 05/21/2024 Normal  0.2, 1.0, Normal mg/dL Final    Bilirubin, UA 05/21/2024 Negative  Negative Final    Blood, UA 05/21/2024 Negative  Negative Final    Specific Gravity, UA 05/21/2024 1.009  <=1.030 Final    POC Preg Test, Ur 05/21/2024 Negative  Negative Final     Acceptable 05/21/2024 Yes   Final     US Abdomen Limited_Gallbladder  Narrative: EXAMINATION:  US ABDOMEN LIMITED_GALLBLADDER    CLINICAL HISTORY:  pain;    TECHNIQUE:  Multiplane limited abdominal ultrasound with grayscale and color Doppler.    COMPARISON:  None.    FINDINGS:  Moderate diffuse hepatic steatosis.  The liver is enlarged measuring 20 cm.    There is no evidence for cholelithiasis, gallbladder wall thickening, or pericholecystic fluid.    The common bile duct measures 4 mm.    The right kidney measures 12.8 cm.  The right kidney is normal.    The pancreas as visualized is unremarkable in appearance.    The visualized aorta and IVC are unremarkable in appearance.  Impression: Moderate diffuse hepatic steatosis. The liver is enlarged measuring 20 cm.    Ultrasound images captured and stored.    Electronically signed by: Jarad Fulton  Date:    05/21/2024  Time:    13:27  X-Ray Chest AP Portable  Narrative:  EXAMINATION:  XR CHEST AP PORTABLE    CLINICAL HISTORY:  Chest pain, unspecified    COMPARISON:  None available    TECHNIQUE:  XR CHEST AP PORTABLE    FINDINGS:  The heart and mediastinum are normal in size and configuration.  The pulmonary vascularity is normal in caliber.  No lung infiltrates, effusions, pneumothorax or other abnormality is demonstrated.  Impression: No evidence of cardiopulmonary disease.    Electronically signed by: Capo Castaneda  Date:    05/21/2024  Time:    10:43      Assessment and Plan (including Health Maintenance)      Problem List  Smart Sets  Document Outside HM   :    Health Maintenance Due   Topic Date Due    Lipid Panel  Never done    Pneumococcal Vaccines (Age 0-64) (1 of 2 - PCV) Never done    Chlamydia Screening  Never done    HPV Vaccines (1 - 3-dose series) Never done    COVID-19 Vaccine (1 - 2023-24 season) Never done       Problem List Items Addressed This Visit          Psychiatric    Generalized anxiety disorder    Current Assessment & Plan     Requests med change from prozac and would like something to help to stop vaping  Will d/c prozac and add wellbutrin po   Discussed risks/benefits/alternatives for treatment; patient voices understanding          Relevant Medications    buPROPion (WELLBUTRIN XL) 150 MG TB24 tablet       Cardiac/Vascular    Hypertension - Primary    Current Assessment & Plan     Condition is stable  Will continue current plan and meds refills   Added new referral for cardiology per patients request          Relevant Medications    valsartan-hydrochlorothiazide (DIOVAN-HCT) 320-25 mg per tablet    Other Relevant Orders    Ambulatory referral/consult to Cardiology     Other Visit Diagnoses       Gastroesophageal reflux disease, unspecified whether esophagitis present        Relevant Medications    pantoprazole (PROTONIX) 40 MG tablet            Health Maintenance Topics with due status: Not Due       Topic Last Completion Date    TETANUS VACCINE  08/01/2016    Influenza Vaccine Not Due       Future Appointments   Date Time Provider Department Center   9/3/2024  3:20 PM [facility]          Signature:  [facility] [address] [place] [address]  [phone]    Date of encounter: 5/30/24

## 2024-06-03 NOTE — ASSESSMENT & PLAN NOTE
Condition is stable  Will continue current plan and meds refills   Added new referral for cardiology per patients request

## 2024-06-26 ENCOUNTER — OFFICE VISIT (OUTPATIENT)
Dept: FAMILY MEDICINE | Facility: CLINIC | Age: 19
End: 2024-06-26
Payer: COMMERCIAL

## 2024-06-26 VITALS
OXYGEN SATURATION: 98 % | TEMPERATURE: 98 F | BODY MASS INDEX: 39.68 KG/M2 | SYSTOLIC BLOOD PRESSURE: 138 MMHG | RESPIRATION RATE: 19 BRPM | HEIGHT: 72 IN | DIASTOLIC BLOOD PRESSURE: 80 MMHG | WEIGHT: 293 LBS | HEART RATE: 105 BPM

## 2024-06-26 DIAGNOSIS — E66.01 MORBID OBESITY WITH BMI OF 40.0-44.9, ADULT: ICD-10-CM

## 2024-06-26 DIAGNOSIS — F41.1 GENERALIZED ANXIETY DISORDER: Primary | ICD-10-CM

## 2024-06-26 DIAGNOSIS — E88.810 METABOLIC SYNDROME: ICD-10-CM

## 2024-06-26 DIAGNOSIS — K21.9 GASTROESOPHAGEAL REFLUX DISEASE, UNSPECIFIED WHETHER ESOPHAGITIS PRESENT: ICD-10-CM

## 2024-06-26 DIAGNOSIS — I10 HYPERTENSION, UNSPECIFIED TYPE: ICD-10-CM

## 2024-06-26 PROCEDURE — 3044F HG A1C LEVEL LT 7.0%: CPT | Mod: CPTII,,, | Performed by: NURSE PRACTITIONER

## 2024-06-26 PROCEDURE — 4010F ACE/ARB THERAPY RXD/TAKEN: CPT | Mod: CPTII,,, | Performed by: NURSE PRACTITIONER

## 2024-06-26 PROCEDURE — 3079F DIAST BP 80-89 MM HG: CPT | Mod: CPTII,,, | Performed by: NURSE PRACTITIONER

## 2024-06-26 PROCEDURE — 3075F SYST BP GE 130 - 139MM HG: CPT | Mod: CPTII,,, | Performed by: NURSE PRACTITIONER

## 2024-06-26 PROCEDURE — 1159F MED LIST DOCD IN RCRD: CPT | Mod: CPTII,,, | Performed by: NURSE PRACTITIONER

## 2024-06-26 PROCEDURE — 3008F BODY MASS INDEX DOCD: CPT | Mod: CPTII,,, | Performed by: NURSE PRACTITIONER

## 2024-06-26 PROCEDURE — 99214 OFFICE O/P EST MOD 30 MIN: CPT | Mod: ,,, | Performed by: NURSE PRACTITIONER

## 2024-06-26 PROCEDURE — 1160F RVW MEDS BY RX/DR IN RCRD: CPT | Mod: CPTII,,, | Performed by: NURSE PRACTITIONER

## 2024-06-26 RX ORDER — PANTOPRAZOLE SODIUM 40 MG/1
40 TABLET, DELAYED RELEASE ORAL DAILY
Qty: 90 TABLET | Refills: 1 | Status: SHIPPED | OUTPATIENT
Start: 2024-06-26

## 2024-06-26 RX ORDER — TIRZEPATIDE 2.5 MG/.5ML
2.5 INJECTION, SOLUTION SUBCUTANEOUS
Qty: 4 PEN | Refills: 0 | Status: SHIPPED | OUTPATIENT
Start: 2024-06-26

## 2024-06-26 RX ORDER — BUPROPION HYDROCHLORIDE 300 MG/1
300 TABLET ORAL DAILY
Qty: 30 TABLET | Refills: 2 | Status: SHIPPED | OUTPATIENT
Start: 2024-06-26 | End: 2025-06-26

## 2024-06-26 NOTE — ASSESSMENT & PLAN NOTE
Condition is stable   Reports appointment with cardiology next week @ [facility]  Will continue current plan

## 2024-06-26 NOTE — ASSESSMENT & PLAN NOTE
Requesting meds for weight loss   Discussed risks/benefits/alternatives for meds  We have tried meds in the past and had problems with insurance coverage  Will try to get zepbound weekly to start   Educated on low fat, low calorie diet, increase water intake  Exercise at least 30 min x 5 times weekly  Increase water intake

## 2024-06-26 NOTE — PROGRESS NOTES
STEPHEN Curry   [facility]  [address] [address] [address]  [phone]      PATIENT NAME: Olga Hernandez  : 2005  DATE: 24  MRN: [id number]      Billing Provider: STEPHEN Curry  Level of Service:   Patient PCP Information       Provider PCP Type    STEPHEN Curry General            Reason for Visit / Chief Complaint: Anxiety (Olga Hernandez 19 year old white female presents for a 2 week follow up. She was prescribed wellbutrin 3-4 weeks ago and has not noticed any difference in anxiety level and she is still vaping the same amount as before starting the medication.), Weight Loss (She was unable to get insurance to cover wegovy and would like to know if she has any other options like Adipex if possible.), and Health Maintenance (Lipid Panel Never done/Pneumococcal Vaccines (Age 0-64)(1 of 2 - PCV) Never done/Chlamydia Screening Never done/HPV Vaccines(1 - 3-dose series) Never done/COVID-19 Vaccine( season) Never done/Patient verbally declined all care gaps at this time)       Update PCP  Update Chief Complaint         History of Present Illness / Problem Focused Workflow     19 year old female presents for medication refills  Blood pressure continues to be stable      Review of Systems     Review of Systems   Constitutional:  Positive for unexpected weight change. Negative for fatigue and fever.   HENT:  Negative for congestion.    Respiratory:  Negative for cough and shortness of breath.    Cardiovascular:  Negative for palpitations.   Gastrointestinal:  Negative for abdominal pain, constipation and diarrhea.   Endocrine: Negative for polydipsia and polyuria.   Musculoskeletal:  Negative for gait problem.   Neurological:  Negative for dizziness, weakness and headaches.   Psychiatric/Behavioral:  Negative for agitation and dysphoric mood.        Medical / Social / Family History     Past Medical History:    Diagnosis Date    Anxiety     Hypertension        Past Surgical History:   Procedure Laterality Date    INNER EAR SURGERY Bilateral     TONSILLECTOMY      WISDOM TOOTH EXTRACTION         Social History  Ms.  reports that she has been smoking vaping with nicotine. She started smoking about 2 years ago. She has been exposed to tobacco smoke. She has never used smokeless tobacco. She reports current alcohol use. She reports that she does not use drugs.    Family History  Ms.'s family history includes No Known Problems in her father and mother.    Medications and Allergies     Medications  Outpatient Medications Marked as Taking for the 6/26/24 encounter (Office Visit) with Veronika Roman FNP   Medication Sig Dispense Refill    valsartan-hydrochlorothiazide (DIOVAN-HCT) 320-25 mg per tablet Take 1 tablet by mouth once daily. 90 tablet 1    [DISCONTINUED] buPROPion (WELLBUTRIN XL) 150 MG TB24 tablet Take 1 tablet (150 mg total) by mouth once daily. 30 tablet 2    [DISCONTINUED] pantoprazole (PROTONIX) 40 MG tablet Take 1 tablet (40 mg total) by mouth once daily. 90 tablet 1       Allergies  Review of patient's allergies indicates:  No Known Allergies    Physical Examination     Vitals:    06/26/24 1531   BP: 138/80   Pulse: 105   Resp: 19   Temp: 98.1 °F (36.7 °C)     Physical Exam  Constitutional:       General: She is not in acute distress.     Appearance: She is obese.   HENT:      Head: Normocephalic.      Nose: Nose normal.      Mouth/Throat:      Mouth: Mucous membranes are moist.   Eyes:      Extraocular Movements: Extraocular movements intact.   Cardiovascular:      Rate and Rhythm: Normal rate.   Pulmonary:      Effort: Pulmonary effort is normal. No respiratory distress.   Abdominal:      General: Bowel sounds are normal.      Palpations: Abdomen is soft.   Musculoskeletal:         General: Normal range of motion.      Cervical back: Normal range of motion.   Skin:     General: Skin is warm.   Neurological:       Mental Status: She is alert.   Psychiatric:         Behavior: Behavior normal.           Imaging / Labs     No visits with results within 1 Day(s) from this visit.   Latest known visit with results is:   Admission on 05/21/2024, Discharged on 05/21/2024   Component Date Value Ref Range Status    Sodium 05/21/2024 137  136 - 145 mmol/L Final    Potassium 05/21/2024 3.7  3.5 - 5.1 mmol/L Final    Chloride 05/21/2024 99  98 - 107 mmol/L Final    CO2 05/21/2024 29  21 - 32 mmol/L Final    Anion Gap 05/21/2024 13  7 - 16 mmol/L Final    Glucose 05/21/2024 97  74 - 106 mg/dL Final    BUN 05/21/2024 15  7 - 18 mg/dL Final    Creatinine 05/21/2024 0.76  0.55 - 1.02 mg/dL Final    BUN/Creatinine Ratio 05/21/2024 20  6 - 20 Final    Calcium 05/21/2024 9.7  8.5 - 10.1 mg/dL Final    Total Protein 05/21/2024 8.1  6.4 - 8.2 g/dL Final    Albumin 05/21/2024 4.0  3.5 - 5.0 g/dL Final    Globulin 05/21/2024 4.1 (H)  2.0 - 4.0 g/dL Final    A/G Ratio 05/21/2024 1.0   Final    Bilirubin, Total 05/21/2024 0.3  >0.0 - 1.2 mg/dL Final    Alk Phos 05/21/2024 99  52 - 144 U/L Final    ALT 05/21/2024 48  13 - 56 U/L Final    AST 05/21/2024 31  15 - 37 U/L Final    eGFR 05/21/2024 115  >=60 mL/min/1.73m2 Final    ProBNP 05/21/2024 19  1 - 125 pg/mL Final    Troponin I High Sensitivity 05/21/2024 <4.0  <=60.4 pg/mL Final    QRS Duration 05/21/2024 100  ms Final    OHS QTC Calculation 05/21/2024 415  ms Final    WBC 05/21/2024 7.92  4.50 - 11.00 K/uL Final    RBC 05/21/2024 4.66  4.20 - 5.40 M/uL Final    Hemoglobin 05/21/2024 13.5  12.0 - 16.0 g/dL Final    Hematocrit 05/21/2024 41.7  38.0 - 47.0 % Final    MCV 05/21/2024 89.5  80.0 - 96.0 fL Final    MCH 05/21/2024 29.0  27.0 - 31.0 pg Final    MCHC 05/21/2024 32.4  32.0 - 36.0 g/dL Final    RDW 05/21/2024 12.8  11.5 - 14.5 % Final    Platelet Count 05/21/2024 277  150 - 400 K/uL Final    MPV 05/21/2024 11.4  9.4 - 12.4 fL Final    Neutrophils % 05/21/2024 54.2  53.0 - 65.0 % Final     Lymphocytes % 05/21/2024 35.0  27.0 - 41.0 % Final    Monocytes % 05/21/2024 8.1 (H)  2.0 - 6.0 % Final    Eosinophils % 05/21/2024 1.4  1.0 - 4.0 % Final    Basophils % 05/21/2024 0.8  0.0 - 1.0 % Final    Immature Granulocytes % 05/21/2024 0.5 (H)  0.0 - 0.4 % Final    nRBC, Auto 05/21/2024 0.0  <=0.0 % Final    Neutrophils, Abs 05/21/2024 4.30  1.80 - 7.70 K/uL Final    Lymphocytes, Absolute 05/21/2024 2.77  1.00 - 4.80 K/uL Final    Monocytes, Absolute 05/21/2024 0.64  0.00 - 0.80 K/uL Final    Eosinophils, Absolute 05/21/2024 0.11  0.00 - 0.50 K/uL Final    Basophils, Absolute 05/21/2024 0.06  0.00 - 0.20 K/uL Final    Immature Granulocytes, Absolute 05/21/2024 0.04  0.00 - 0.04 K/uL Final    nRBC, Absolute 05/21/2024 0.00  <=0.00 x10e3/uL Final    Diff Type 05/21/2024 Auto   Final    Color, UA 05/21/2024 Colorless  Colorless, Straw, Yellow, Light Yellow, Dark Yellow Final    Clarity, UA 05/21/2024 Clear  Clear Final    pH, UA 05/21/2024 6.5  5.0 to 8.0 pH Units Final    Leukocytes, UA 05/21/2024 Negative  Negative Final    Nitrites, UA 05/21/2024 Negative  Negative Final    Protein, UA 05/21/2024 Negative  Negative Final    Glucose, UA 05/21/2024 Normal  Normal mg/dL Final    Ketones, UA 05/21/2024 Negative  Negative mg/dL Final    Urobilinogen, UA 05/21/2024 Normal  0.2, 1.0, Normal mg/dL Final    Bilirubin, UA 05/21/2024 Negative  Negative Final    Blood, UA 05/21/2024 Negative  Negative Final    Specific Gravity, UA 05/21/2024 1.009  <=1.030 Final    POC Preg Test, Ur 05/21/2024 Negative  Negative Final     Acceptable 05/21/2024 Yes   Final     US Abdomen Limited_Gallbladder  Narrative: EXAMINATION:  US ABDOMEN LIMITED_GALLBLADDER    CLINICAL HISTORY:  pain;    TECHNIQUE:  Multiplane limited abdominal ultrasound with grayscale and color Doppler.    COMPARISON:  None.    FINDINGS:  Moderate diffuse hepatic steatosis.  The liver is enlarged measuring 20 cm.    There is no evidence for  "cholelithiasis, gallbladder wall thickening, or pericholecystic fluid.    The common bile duct measures 4 mm.    The right kidney measures 12.8 cm.  The right kidney is normal.    The pancreas as visualized is unremarkable in appearance.    The visualized aorta and IVC are unremarkable in appearance.  Impression: Moderate diffuse hepatic steatosis. The liver is enlarged measuring 20 cm.    Ultrasound images captured and stored.    Electronically signed by: Jarad Fulton  Date:    05/21/2024  Time:    13:27  X-Ray Chest AP Portable  Narrative: EXAMINATION:  XR CHEST AP PORTABLE    CLINICAL HISTORY:  Chest pain, unspecified    COMPARISON:  None available    TECHNIQUE:  XR CHEST AP PORTABLE    FINDINGS:  The heart and mediastinum are normal in size and configuration.  The pulmonary vascularity is normal in caliber.  No lung infiltrates, effusions, pneumothorax or other abnormality is demonstrated.  Impression: No evidence of cardiopulmonary disease.    Electronically signed by: Capo Castaneda  Date:    05/21/2024  Time:    10:43      Assessment and Plan (including Health Maintenance)      Problem List  Smart Sets  Document Outside HM   :    Health Maintenance Due   Topic Date Due    Lipid Panel  Never done    Pneumococcal Vaccines (Age 0-64) (1 of 2 - PCV) Never done    Chlamydia Screening  Never done    HPV Vaccines (1 - 3-dose series) Never done    COVID-19 Vaccine (1 - 2023-24 season) Never done       Problem List Items Addressed This Visit          Psychiatric    Generalized anxiety disorder - Primary    Current Assessment & Plan     States meds not helping as much as she thinks it should  Reports having "panic attack" since last visit  Will increase wellbutrin to 300 mg po    Discussed risks/benefits/alternatives for treatment; patient voices understanding          Relevant Medications    buPROPion (WELLBUTRIN XL) 300 MG 24 hr tablet       Cardiac/Vascular    Hypertension    Current Assessment & Plan     " Condition is stable   Reports appointment with cardiology next week @ [facility]  Will continue current plan             Endocrine    Morbid obesity with BMI of 40.0-44.9, adult    Current Assessment & Plan     Requesting meds for weight loss   Discussed risks/benefits/alternatives for meds  We have tried meds in the past and had problems with insurance coverage  Will try to get zepbound weekly to start   Educated on low fat, low calorie diet, increase water intake  Exercise at least 30 min x 5 times weekly  Increase water intake          Relevant Medications    tirzepatide, weight loss, (ZEPBOUND) 2.5 mg/0.5 mL PnIj    Metabolic syndrome    Relevant Medications    tirzepatide, weight loss, (ZEPBOUND) 2.5 mg/0.5 mL PnIj     Other Visit Diagnoses       Gastroesophageal reflux disease, unspecified whether esophagitis present        Relevant Medications    pantoprazole (PROTONIX) 40 MG tablet            Health Maintenance Topics with due status: Not Due       Topic Last Completion Date    TETANUS VACCINE 08/01/2016    Influenza Vaccine Not Due       Future Appointments   Date Time Provider Department Center   9/3/2024  3:20 PM [facility]          Signature:  [facility] [address] [place] [address]  [phone]    Date of encounter: 6/26/24

## 2024-06-26 NOTE — ASSESSMENT & PLAN NOTE
"States meds not helping as much as she thinks it should  Reports having "panic attack" since last visit  Will increase wellbutrin to 300 mg po    Discussed risks/benefits/alternatives for treatment; patient voices understanding   "

## 2024-07-01 ENCOUNTER — TELEPHONE (OUTPATIENT)
Dept: FAMILY MEDICINE | Facility: CLINIC | Age: 19
End: 2024-07-01
Payer: COMMERCIAL

## 2024-07-01 NOTE — TELEPHONE ENCOUNTER
----- Message from Rachael Qureshi sent at 7/1/2024 11:28 AM CDT -----  Regarding: call back  Call back about her PA

## 2024-07-08 ENCOUNTER — TELEPHONE (OUTPATIENT)
Dept: FAMILY MEDICINE | Facility: CLINIC | Age: 19
End: 2024-07-08
Payer: COMMERCIAL

## 2024-07-08 NOTE — TELEPHONE ENCOUNTER
----- Message from Courtney Dobbins sent at 7/8/2024 10:06 AM CDT -----  Regarding: med prescription  Patient wants to speak to a nurse about changing her weight loss prescription . Please call her back at [phone].

## 2024-10-03 ENCOUNTER — PATIENT OUTREACH (OUTPATIENT)
Facility: HOSPITAL | Age: 19
End: 2024-10-03
Payer: COMMERCIAL

## 2025-03-05 ENCOUNTER — PATIENT OUTREACH (OUTPATIENT)
Facility: HOSPITAL | Age: 20
End: 2025-03-05
Payer: COMMERCIAL

## 2025-03-05 NOTE — PROGRESS NOTES
Population Health Chart Review & Patient Outreach Details    Updates Requested / Reviewed:  [x]  Care Team Updated  [x]  Care Everywhere Updated & Reviewed  [x]  [facility] & [facility] Reviewed  [x]   Reviewed      Health Maintenance Topics Addressed and Outreach Outcomes / Actions Taken:  Chlamydia Screening  [x] No documentation found in [facility], [facility], or Care Everywhere for Chlamydia Screening.      [x] Comment placed in chart that patient needs this. No upcoming appointment scheduled at this time.